# Patient Record
Sex: FEMALE | Race: WHITE | NOT HISPANIC OR LATINO | Employment: UNEMPLOYED | URBAN - METROPOLITAN AREA
[De-identification: names, ages, dates, MRNs, and addresses within clinical notes are randomized per-mention and may not be internally consistent; named-entity substitution may affect disease eponyms.]

---

## 2018-12-13 ENCOUNTER — HOSPITAL ENCOUNTER (EMERGENCY)
Facility: HOSPITAL | Age: 48
Discharge: PSYCHIATRIC OR SUBSTANCE USE FACILITY | End: 2018-12-13
Attending: EMERGENCY MEDICINE

## 2018-12-13 VITALS
OXYGEN SATURATION: 99 % | HEART RATE: 86 BPM | TEMPERATURE: 99 F | SYSTOLIC BLOOD PRESSURE: 119 MMHG | WEIGHT: 140 LBS | HEIGHT: 61 IN | RESPIRATION RATE: 18 BRPM | BODY MASS INDEX: 26.43 KG/M2 | DIASTOLIC BLOOD PRESSURE: 62 MMHG

## 2018-12-13 DIAGNOSIS — F29 PSYCHOSIS, UNSPECIFIED PSYCHOSIS TYPE: ICD-10-CM

## 2018-12-13 DIAGNOSIS — F30.9 MANIA: Primary | ICD-10-CM

## 2018-12-13 LAB
ALBUMIN SERPL BCP-MCNC: 4.2 G/DL
ALP SERPL-CCNC: 60 U/L
ALT SERPL W/O P-5'-P-CCNC: 53 U/L
AMMONIA PLAS-SCNC: 49 UMOL/L
AMPHET+METHAMPHET UR QL: NORMAL
ANION GAP SERPL CALC-SCNC: 11 MMOL/L
APAP SERPL-MCNC: <3 UG/ML
AST SERPL-CCNC: 45 U/L
B-HCG UR QL: NEGATIVE
BARBITURATES UR QL SCN>200 NG/ML: NEGATIVE
BASOPHILS # BLD AUTO: 0.05 K/UL
BASOPHILS NFR BLD: 0.5 %
BENZODIAZ UR QL SCN>200 NG/ML: NORMAL
BILIRUB SERPL-MCNC: 0.4 MG/DL
BILIRUB UR QL STRIP: NEGATIVE
BUN SERPL-MCNC: 26 MG/DL
BZE UR QL SCN: NEGATIVE
CALCIUM SERPL-MCNC: 9.3 MG/DL
CANNABINOIDS UR QL SCN: NEGATIVE
CHLORIDE SERPL-SCNC: 106 MMOL/L
CLARITY UR: CLEAR
CO2 SERPL-SCNC: 24 MMOL/L
COLOR UR: YELLOW
CREAT SERPL-MCNC: 1.5 MG/DL
CREAT UR-MCNC: 220.3 MG/DL
DIFFERENTIAL METHOD: ABNORMAL
EOSINOPHIL # BLD AUTO: 0.5 K/UL
EOSINOPHIL NFR BLD: 4.6 %
ERYTHROCYTE [DISTWIDTH] IN BLOOD BY AUTOMATED COUNT: 13.1 %
EST. GFR  (AFRICAN AMERICAN): 47 ML/MIN/1.73 M^2
EST. GFR  (NON AFRICAN AMERICAN): 41 ML/MIN/1.73 M^2
ETHANOL SERPL-MCNC: <10 MG/DL
GLUCOSE SERPL-MCNC: 93 MG/DL
GLUCOSE UR QL STRIP: NEGATIVE
HCT VFR BLD AUTO: 38.7 %
HGB BLD-MCNC: 13.5 G/DL
HGB UR QL STRIP: NEGATIVE
KETONES UR QL STRIP: NEGATIVE
LEUKOCYTE ESTERASE UR QL STRIP: NEGATIVE
LYMPHOCYTES # BLD AUTO: 2.8 K/UL
LYMPHOCYTES NFR BLD: 26.2 %
MCH RBC QN AUTO: 31.1 PG
MCHC RBC AUTO-ENTMCNC: 34.9 G/DL
MCV RBC AUTO: 89 FL
METHADONE UR QL SCN>300 NG/ML: NEGATIVE
MONOCYTES # BLD AUTO: 0.9 K/UL
MONOCYTES NFR BLD: 8.1 %
NEUTROPHILS # BLD AUTO: 6.6 K/UL
NEUTROPHILS NFR BLD: 60.6 %
NITRITE UR QL STRIP: NEGATIVE
OPIATES UR QL SCN: NORMAL
PCP UR QL SCN>25 NG/ML: NEGATIVE
PH UR STRIP: 6 [PH] (ref 5–8)
PLATELET # BLD AUTO: 304 K/UL
PMV BLD AUTO: 10.8 FL
POTASSIUM SERPL-SCNC: 3.6 MMOL/L
PROT SERPL-MCNC: 7.7 G/DL
PROT UR QL STRIP: ABNORMAL
RBC # BLD AUTO: 4.34 M/UL
SALICYLATES SERPL-MCNC: <5 MG/DL
SODIUM SERPL-SCNC: 141 MMOL/L
SP GR UR STRIP: 1.03 (ref 1–1.03)
TOXICOLOGY INFORMATION: NORMAL
TSH SERPL DL<=0.005 MIU/L-ACNC: 2.28 UIU/ML
TSH SERPL DL<=0.005 MIU/L-ACNC: 2.28 UIU/ML
URN SPEC COLLECT METH UR: ABNORMAL
UROBILINOGEN UR STRIP-ACNC: NEGATIVE EU/DL
WBC # BLD AUTO: 10.86 K/UL

## 2018-12-13 PROCEDURE — 25000003 PHARM REV CODE 250: Performed by: EMERGENCY MEDICINE

## 2018-12-13 PROCEDURE — 80329 ANALGESICS NON-OPIOID 1 OR 2: CPT

## 2018-12-13 PROCEDURE — 85025 COMPLETE CBC W/AUTO DIFF WBC: CPT

## 2018-12-13 PROCEDURE — 96372 THER/PROPH/DIAG INJ SC/IM: CPT

## 2018-12-13 PROCEDURE — 81025 URINE PREGNANCY TEST: CPT

## 2018-12-13 PROCEDURE — 63600175 PHARM REV CODE 636 W HCPCS: Performed by: EMERGENCY MEDICINE

## 2018-12-13 PROCEDURE — 82140 ASSAY OF AMMONIA: CPT

## 2018-12-13 PROCEDURE — 84443 ASSAY THYROID STIM HORMONE: CPT

## 2018-12-13 PROCEDURE — 80307 DRUG TEST PRSMV CHEM ANLYZR: CPT

## 2018-12-13 PROCEDURE — 36415 COLL VENOUS BLD VENIPUNCTURE: CPT

## 2018-12-13 PROCEDURE — 99285 EMERGENCY DEPT VISIT HI MDM: CPT | Mod: 25

## 2018-12-13 PROCEDURE — 80320 DRUG SCREEN QUANTALCOHOLS: CPT

## 2018-12-13 PROCEDURE — 81003 URINALYSIS AUTO W/O SCOPE: CPT | Mod: 59

## 2018-12-13 PROCEDURE — 80053 COMPREHEN METABOLIC PANEL: CPT

## 2018-12-13 PROCEDURE — 96360 HYDRATION IV INFUSION INIT: CPT

## 2018-12-13 RX ORDER — DIPHENHYDRAMINE HYDROCHLORIDE 50 MG/ML
50 INJECTION INTRAMUSCULAR; INTRAVENOUS
Status: COMPLETED | OUTPATIENT
Start: 2018-12-13 | End: 2018-12-13

## 2018-12-13 RX ORDER — HALOPERIDOL 5 MG/ML
5 INJECTION INTRAMUSCULAR
Status: COMPLETED | OUTPATIENT
Start: 2018-12-13 | End: 2018-12-13

## 2018-12-13 RX ADMIN — SODIUM CHLORIDE 1000 ML: 0.9 INJECTION, SOLUTION INTRAVENOUS at 04:12

## 2018-12-13 RX ADMIN — HALOPERIDOL LACTATE 5 MG: 5 INJECTION, SOLUTION INTRAMUSCULAR at 01:12

## 2018-12-13 RX ADMIN — DIPHENHYDRAMINE HYDROCHLORIDE 50 MG: 50 INJECTION INTRAMUSCULAR; INTRAVENOUS at 01:12

## 2018-12-13 NOTE — ED NOTES
Admit packet faxed to [facility], [facility] [place], [place], [facility], [facility], [facility], [facility], [place], [facility], [facility], [facility], [facility], [facility], [facility], [facility], [facility], [facility], Waiting for response.

## 2018-12-13 NOTE — ED NOTES
PEC initiated by physician;patient belongings removed from room; gray scrubs applied to patient and moved to bed 27. Pt transferred to room by nurse house RN

## 2018-12-13 NOTE — ED PROVIDER NOTES
SCRIBE #1 NOTE: I, Clemente Vaughn, am scribing for, and in the presence of, Ulysses Russell Jr., MD. I have scribed the HPI, NAY, PEx.     SCRIBE #2 NOTE: I, Sánchez Robb, am scribing for, and in the presence of,  Chelsey Fitzpatrick MD. I have scribed the remaining portions of the note not scribed by Scribe #1.     History      Chief Complaint   Patient presents with    Altered Mental Status     family says pt has had AMS x3d, violent upon EMS arrival. 100mg Ketamine given en route. Pt states she took a xanax that someone gave her.        Review of patient's allergies indicates:  No Known Allergies     HPI   HPI    12/13/2018, 1:00 AM   History obtained from the patient and EMS      History of Present Illness: Shanell Watts is a 48 y.o. female patient who presents to the Emergency Department for AMS x3 days. Pt's family reportedly called EMS due to pt's erratic and agitated behavior. She became agitated and violent with EMS during transport and was given 100 mg ketamine. She told EMS she had taken a xanax earlier today. Per EMS, pt has a hx of psychiatric illness and substance abuse, and has not been taking her anti-psychotics for 6 months. Symptoms are constant and moderate in severity. No mitigating or exacerbating factors reported. No other sxs. Patient denies any SI, fever, n/v/d, abd pain, CP, SOB, and all other sxs at this time. No further complaints or concerns at this time.         Arrival mode: AASI    PCP: Primary Doctor No     Past Medical History:  Past medical history reviewed not relevant      Past Surgical History:  Past surgical history reviewed not relevant      Family History:  Family history reviewed not relevant      Social History:  Social History    Social History Main Topics    Social History Main Topics    Smoking status: Unknown if ever smoked    Smokeless tobacco: Unknown if ever used    Alcohol Use: Unknown drinking history    Drug Use: Unknown if ever used    Sexual Activity: Unknown          ROS   Review of Systems   Constitutional: Negative for chills and fever.   HENT: Negative for sore throat.    Respiratory: Negative for shortness of breath.    Cardiovascular: Negative for chest pain.   Gastrointestinal: Negative for abdominal pain, diarrhea, nausea and vomiting.   Genitourinary: Negative for dysuria.   Musculoskeletal: Negative for back pain.   Skin: Negative for rash.   Neurological: Negative for dizziness, weakness, light-headedness and headaches.        (+) AMS   Hematological: Does not bruise/bleed easily.   Psychiatric/Behavioral: Positive for agitation. Negative for suicidal ideas.     Physical Exam      Initial Vitals [12/13/18 0105]   BP Pulse Resp Temp SpO2   (!) 187/107 (!) 130 (!) 22 98.7 °F (37.1 °C) 98 %      MAP       --          Physical Exam  Nursing Notes and Vital Signs Reviewed.  Constitutional: Patient is a bit confused on awakening from ketamine given prior to arrival.. Well-developed and well-nourished.  She is somewhat disheveled.  Head: Atraumatic. Normocephalic.  Eyes: PERRL. EOM intact. Conjunctivae are not pale. No scleral icterus.  ENT: Mucous membranes are moist. Oropharynx is clear and symmetric.    Neck: Supple. Full ROM. No lymphadenopathy.  Cardiovascular: Regular rate. Regular rhythm. No murmurs, rubs, or gallops. Distal pulses are 2+ and symmetric.  Pulmonary/Chest: No respiratory distress. Clear to auscultation bilaterally. No wheezing or rales.  Abdominal: Soft and non-distended.  There is no tenderness.  No rebound, guarding, or rigidity.   Musculoskeletal: Moves all extremities. No obvious deformities. No edema. No calf tenderness.  Skin: Warm and dry.  Neurological:  Alert, awake, and appropriate.  Normal speech.  No acute focal neurological deficits are appreciated.  Psychiatric:  Patient is labile with pressured speech and nonlinear thought content.  She was violent prior to arrival requiring chemical restraints by EMS with ketamine.  She is not  currently violently but she is somewhat confused.  She is tangential appears to be manic with an elevated mood.    ED Course    Procedures  ED Vital Signs:  Vitals:    12/13/18 0105 12/13/18 0110   BP: (!) 187/107 (!) 156/88   Pulse: (!) 130 (!) 125   Resp: (!) 22 (!) 24   Temp: 98.7 °F (37.1 °C)    TempSrc: Oral    SpO2: 98% 97%       Abnormal Lab Results:  Labs Reviewed   CBC W/ AUTO DIFFERENTIAL - Abnormal; Notable for the following components:       Result Value    MCH 31.1 (*)     All other components within normal limits   COMPREHENSIVE METABOLIC PANEL - Abnormal; Notable for the following components:    BUN, Bld 26 (*)     Creatinine 1.5 (*)     AST 45 (*)     ALT 53 (*)     eGFR if  47 (*)     eGFR if non  41 (*)     All other components within normal limits   URINALYSIS, REFLEX TO URINE CULTURE - Abnormal; Notable for the following components:    Protein, UA Trace (*)     All other components within normal limits    Narrative:     Preferred Collection Type->Urine, Clean Catch   ACETAMINOPHEN LEVEL - Abnormal; Notable for the following components:    Acetaminophen (Tylenol), Serum <3.0 (*)     All other components within normal limits   SALICYLATE LEVEL - Abnormal; Notable for the following components:    Salicylate Lvl <5.0 (*)     All other components within normal limits   TSH   DRUG SCREEN PANEL, URINE EMERGENCY   ALCOHOL,MEDICAL (ETHANOL)   PREGNANCY TEST, URINE RAPID   AMMONIA   TSH        All Lab Results:  Results for orders placed or performed during the hospital encounter of 12/13/18   CBC auto differential   Result Value Ref Range    WBC 10.86 3.90 - 12.70 K/uL    RBC 4.34 4.00 - 5.40 M/uL    Hemoglobin 13.5 12.0 - 16.0 g/dL    Hematocrit 38.7 37.0 - 48.5 %    MCV 89 82 - 98 fL    MCH 31.1 (H) 27.0 - 31.0 pg    MCHC 34.9 32.0 - 36.0 g/dL    RDW 13.1 11.5 - 14.5 %    Platelets 304 150 - 350 K/uL    MPV 10.8 9.2 - 12.9 fL    Gran # (ANC) 6.6 1.8 - 7.7 K/uL    Lymph #  2.8 1.0 - 4.8 K/uL    Mono # 0.9 0.3 - 1.0 K/uL    Eos # 0.5 0.0 - 0.5 K/uL    Baso # 0.05 0.00 - 0.20 K/uL    Gran% 60.6 38.0 - 73.0 %    Lymph% 26.2 18.0 - 48.0 %    Mono% 8.1 4.0 - 15.0 %    Eosinophil% 4.6 0.0 - 8.0 %    Basophil% 0.5 0.0 - 1.9 %    Differential Method Automated    Comprehensive metabolic panel   Result Value Ref Range    Sodium 141 136 - 145 mmol/L    Potassium 3.6 3.5 - 5.1 mmol/L    Chloride 106 95 - 110 mmol/L    CO2 24 23 - 29 mmol/L    Glucose 93 70 - 110 mg/dL    BUN, Bld 26 (H) 6 - 20 mg/dL    Creatinine 1.5 (H) 0.5 - 1.4 mg/dL    Calcium 9.3 8.7 - 10.5 mg/dL    Total Protein 7.7 6.0 - 8.4 g/dL    Albumin 4.2 3.5 - 5.2 g/dL    Total Bilirubin 0.4 0.1 - 1.0 mg/dL    Alkaline Phosphatase 60 55 - 135 U/L    AST 45 (H) 10 - 40 U/L    ALT 53 (H) 10 - 44 U/L    Anion Gap 11 8 - 16 mmol/L    eGFR if African American 47 (A) >60 mL/min/1.73 m^2    eGFR if non African American 41 (A) >60 mL/min/1.73 m^2   TSH   Result Value Ref Range    TSH 2.280 0.400 - 4.000 uIU/mL   Urinalysis, Reflex to Urine Culture Urine, Clean Catch   Result Value Ref Range    Specimen UA Urine, Catheterized     Color, UA Yellow Yellow, Straw, Raeann    Appearance, UA Clear Clear    pH, UA 6.0 5.0 - 8.0    Specific Gravity, UA 1.030 1.005 - 1.030    Protein, UA Trace (A) Negative    Glucose, UA Negative Negative    Ketones, UA Negative Negative    Bilirubin (UA) Negative Negative    Occult Blood UA Negative Negative    Nitrite, UA Negative Negative    Urobilinogen, UA Negative <2.0 EU/dL    Leukocytes, UA Negative Negative   Drug screen panel, emergency   Result Value Ref Range    Benzodiazepines Presumptive Positive     Methadone metabolites Negative     Cocaine (Metab.) Negative     Opiate Scrn, Ur Presumptive Positive     Barbiturate Screen, Ur Negative     Amphetamine Screen, Ur Presumptive Positive     THC Negative     Phencyclidine Negative     Creatinine, Random Ur 220.3 15.0 - 325.0 mg/dL    Toxicology Information  SEE COMMENT    Ethanol   Result Value Ref Range    Alcohol, Medical, Serum <10 <10 mg/dL   Acetaminophen level   Result Value Ref Range    Acetaminophen (Tylenol), Serum <3.0 (L) 10.0 - 20.0 ug/mL   Pregnancy, urine rapid   Result Value Ref Range    Preg Test, Ur Negative    Ammonia   Result Value Ref Range    Ammonia 49 10 - 50 umol/L   Salicylate level   Result Value Ref Range    Salicylate Lvl <5.0 (L) 15.0 - 30.0 mg/dL   TSH   Result Value Ref Range    TSH 2.280 0.400 - 4.000 uIU/mL            The Emergency Provider reviewed the vital signs and test results, which are outlined above.    ED Discussion     1:20 AM: The PEC hold has been issued by Dr. Russell at this time for AMS.    2:00 AM: Dr. Russell hands off pt to Dr. Fitzpatrick pending medical clearance.    4:28 AM: Pt has been medically cleared by Dr. Fitzpatrick at this time. Reassessed pt at this time. Pt is resting comfortably and appears in no acute distress. There are no psychiatric services offered at this facility. D/w pt all pertinent ED information and plan to transfer to psychiatric facility for psychiatric treatment. Pt verbalizes understanding. Patient being transferred by \[facility]\"" for ongoing personal protection en route. Pt will be transported by personnel trained in CPR and CPI. All questions and complaints have been addressed at this time. Pt condition is stable at this time and is clear to transfer to psychiatric facility at this time.     ED Medication(s):  Medications   sodium chloride 0.9% bolus 1,000 mL (not administered)   haloperidol lactate injection 5 mg (5 mg Intramuscular Given 12/13/18 0115)   diphenhydrAMINE injection 50 mg (50 mg Intramuscular Given 12/13/18 0115)             Medical Decision Making    Medical Decision Making:   Clinical Tests:   Lab Tests: Ordered and Reviewed           Scribe Attestation:   Scribe #1: I performed the above scribed service and the documentation accurately describes the services I performed. I attest to the  accuracy of the note.    Attending:   Physician Attestation Statement for Scribe #1: I, Ulysses Russell Jr., MD, personally performed the services described in this documentation, as scribed by Clemente Vaughn, in my presence, and it is both accurate and complete.         Attending Attestation:           Physician Attestation for Scribe:    Physician Attestation Statement for Scribe #2: I, Chelsey Fitzpatrick MD, reviewed documentation, as scribed by Sánchez Robb in my presence, and it is both accurate and complete. I also acknowledge and confirm the content of the note done by Scribe #1.          Clinical Impression       ICD-10-CM ICD-9-CM   1. Chandni F30.9 296.00   2. Psychosis, unspecified psychosis type F29 298.9       Disposition:   Disposition: Transferred  Condition: Stable         Chelsey Fitzpatrick MD  12/13/18 0422

## 2019-06-20 ENCOUNTER — HOSPITAL ENCOUNTER (EMERGENCY)
Facility: HOSPITAL | Age: 49
Discharge: PSYCHIATRIC OR SUBSTANCE USE FACILITY | End: 2019-06-21
Attending: EMERGENCY MEDICINE
Payer: MEDICAID

## 2019-06-20 DIAGNOSIS — N17.9 ACUTE KIDNEY INJURY: ICD-10-CM

## 2019-06-20 DIAGNOSIS — F15.10 METHAMPHETAMINE ABUSE: ICD-10-CM

## 2019-06-20 DIAGNOSIS — R00.0 TACHYCARDIA: ICD-10-CM

## 2019-06-20 DIAGNOSIS — E86.1 INTRAVASCULAR VOLUME DEPLETION: ICD-10-CM

## 2019-06-20 DIAGNOSIS — F23 ACUTE PSYCHOSIS: Primary | ICD-10-CM

## 2019-06-20 LAB
ALBUMIN SERPL BCP-MCNC: 3.9 G/DL (ref 3.5–5.2)
ALBUMIN SERPL BCP-MCNC: 4 G/DL (ref 3.5–5.2)
ALBUMIN SERPL BCP-MCNC: 4.9 G/DL (ref 3.5–5.2)
ALP SERPL-CCNC: 58 U/L (ref 55–135)
ALP SERPL-CCNC: 64 U/L (ref 55–135)
ALP SERPL-CCNC: 76 U/L (ref 55–135)
ALT SERPL W/O P-5'-P-CCNC: 38 U/L (ref 10–44)
ALT SERPL W/O P-5'-P-CCNC: 41 U/L (ref 10–44)
ALT SERPL W/O P-5'-P-CCNC: 46 U/L (ref 10–44)
AMORPH CRY URNS QL MICRO: ABNORMAL
AMPHET+METHAMPHET UR QL: NORMAL
ANION GAP SERPL CALC-SCNC: 11 MMOL/L (ref 8–16)
ANION GAP SERPL CALC-SCNC: 11 MMOL/L (ref 8–16)
ANION GAP SERPL CALC-SCNC: 20 MMOL/L (ref 8–16)
ANISOCYTOSIS BLD QL SMEAR: SLIGHT
ANISOCYTOSIS BLD QL SMEAR: SLIGHT
APAP SERPL-MCNC: <3 UG/ML (ref 10–20)
AST SERPL-CCNC: 81 U/L (ref 10–40)
AST SERPL-CCNC: 91 U/L (ref 10–40)
AST SERPL-CCNC: 93 U/L (ref 10–40)
B-HCG UR QL: NEGATIVE
BACTERIA #/AREA URNS HPF: ABNORMAL /HPF
BARBITURATES UR QL SCN>200 NG/ML: NEGATIVE
BASOPHILS # BLD AUTO: 0 K/UL (ref 0–0.2)
BASOPHILS # BLD AUTO: 0.01 K/UL (ref 0–0.2)
BASOPHILS # BLD AUTO: 0.03 K/UL (ref 0–0.2)
BASOPHILS NFR BLD: 0 % (ref 0–1.9)
BASOPHILS NFR BLD: 0.1 % (ref 0–1.9)
BASOPHILS NFR BLD: 0.2 % (ref 0–1.9)
BENZODIAZ UR QL SCN>200 NG/ML: NEGATIVE
BILIRUB SERPL-MCNC: 0.7 MG/DL (ref 0.1–1)
BILIRUB SERPL-MCNC: 1 MG/DL (ref 0.1–1)
BILIRUB SERPL-MCNC: 1.2 MG/DL (ref 0.1–1)
BILIRUB UR QL STRIP: ABNORMAL
BUN SERPL-MCNC: 34 MG/DL (ref 6–20)
BUN SERPL-MCNC: 47 MG/DL (ref 6–20)
BUN SERPL-MCNC: 60 MG/DL (ref 6–20)
BZE UR QL SCN: NEGATIVE
CALCIUM SERPL-MCNC: 10 MG/DL (ref 8.7–10.5)
CALCIUM SERPL-MCNC: 8.6 MG/DL (ref 8.7–10.5)
CALCIUM SERPL-MCNC: 8.8 MG/DL (ref 8.7–10.5)
CANNABINOIDS UR QL SCN: NEGATIVE
CHLORIDE SERPL-SCNC: 108 MMOL/L (ref 95–110)
CHLORIDE SERPL-SCNC: 110 MMOL/L (ref 95–110)
CHLORIDE SERPL-SCNC: 115 MMOL/L (ref 95–110)
CK SERPL-CCNC: 4125 U/L (ref 20–180)
CK SERPL-CCNC: 4175 U/L (ref 20–180)
CK SERPL-CCNC: 4862 U/L (ref 20–180)
CLARITY UR: ABNORMAL
CO2 SERPL-SCNC: 16 MMOL/L (ref 23–29)
CO2 SERPL-SCNC: 18 MMOL/L (ref 23–29)
CO2 SERPL-SCNC: 19 MMOL/L (ref 23–29)
COLOR UR: YELLOW
CREAT SERPL-MCNC: 0.9 MG/DL (ref 0.5–1.4)
CREAT SERPL-MCNC: 1.1 MG/DL (ref 0.5–1.4)
CREAT SERPL-MCNC: 1.8 MG/DL (ref 0.5–1.4)
CREAT UR-MCNC: 218.6 MG/DL (ref 15–325)
DACRYOCYTES BLD QL SMEAR: ABNORMAL
DACRYOCYTES BLD QL SMEAR: ABNORMAL
DIFFERENTIAL METHOD: ABNORMAL
EOSINOPHIL # BLD AUTO: 0 K/UL (ref 0–0.5)
EOSINOPHIL # BLD AUTO: 0 K/UL (ref 0–0.5)
EOSINOPHIL # BLD AUTO: 0.1 K/UL (ref 0–0.5)
EOSINOPHIL NFR BLD: 0 % (ref 0–8)
EOSINOPHIL NFR BLD: 0.1 % (ref 0–8)
EOSINOPHIL NFR BLD: 0.4 % (ref 0–8)
ERYTHROCYTE [DISTWIDTH] IN BLOOD BY AUTOMATED COUNT: 13.7 % (ref 11.5–14.5)
EST. GFR  (AFRICAN AMERICAN): 38 ML/MIN/1.73 M^2
EST. GFR  (AFRICAN AMERICAN): >60 ML/MIN/1.73 M^2
EST. GFR  (AFRICAN AMERICAN): >60 ML/MIN/1.73 M^2
EST. GFR  (NON AFRICAN AMERICAN): 33 ML/MIN/1.73 M^2
EST. GFR  (NON AFRICAN AMERICAN): 60 ML/MIN/1.73 M^2
EST. GFR  (NON AFRICAN AMERICAN): >60 ML/MIN/1.73 M^2
ETHANOL SERPL-MCNC: <10 MG/DL
GLUCOSE SERPL-MCNC: 101 MG/DL (ref 70–110)
GLUCOSE SERPL-MCNC: 106 MG/DL (ref 70–110)
GLUCOSE SERPL-MCNC: 96 MG/DL (ref 70–110)
GLUCOSE UR QL STRIP: NEGATIVE
HCT VFR BLD AUTO: 39.8 % (ref 37–48.5)
HCT VFR BLD AUTO: 42 % (ref 37–48.5)
HCT VFR BLD AUTO: 43.2 % (ref 37–48.5)
HGB BLD-MCNC: 13.6 G/DL (ref 12–16)
HGB BLD-MCNC: 14.6 G/DL (ref 12–16)
HGB BLD-MCNC: 14.9 G/DL (ref 12–16)
HGB UR QL STRIP: ABNORMAL
HYALINE CASTS #/AREA URNS LPF: 15 /LPF
KETONES UR QL STRIP: ABNORMAL
LEUKOCYTE ESTERASE UR QL STRIP: NEGATIVE
LYMPHOCYTES # BLD AUTO: 1.8 K/UL (ref 1–4.8)
LYMPHOCYTES # BLD AUTO: 2.8 K/UL (ref 1–4.8)
LYMPHOCYTES # BLD AUTO: 2.9 K/UL (ref 1–4.8)
LYMPHOCYTES NFR BLD: 10.3 % (ref 18–48)
LYMPHOCYTES NFR BLD: 16.9 % (ref 18–48)
LYMPHOCYTES NFR BLD: 21.3 % (ref 18–48)
MCH RBC QN AUTO: 30.8 PG (ref 27–31)
MCH RBC QN AUTO: 31.4 PG (ref 27–31)
MCH RBC QN AUTO: 31.7 PG (ref 27–31)
MCHC RBC AUTO-ENTMCNC: 34.2 G/DL (ref 32–36)
MCHC RBC AUTO-ENTMCNC: 34.5 G/DL (ref 32–36)
MCHC RBC AUTO-ENTMCNC: 34.8 G/DL (ref 32–36)
MCV RBC AUTO: 90 FL (ref 82–98)
MCV RBC AUTO: 90 FL (ref 82–98)
MCV RBC AUTO: 92 FL (ref 82–98)
METHADONE UR QL SCN>300 NG/ML: NEGATIVE
MICROSCOPIC COMMENT: ABNORMAL
MONOCYTES # BLD AUTO: 1.2 K/UL (ref 0.3–1)
MONOCYTES # BLD AUTO: 1.3 K/UL (ref 0.3–1)
MONOCYTES # BLD AUTO: 1.9 K/UL (ref 0.3–1)
MONOCYTES NFR BLD: 11.6 % (ref 4–15)
MONOCYTES NFR BLD: 7.8 % (ref 4–15)
MONOCYTES NFR BLD: 8.5 % (ref 4–15)
NEUTROPHILS # BLD AUTO: 11.9 K/UL (ref 1.8–7.7)
NEUTROPHILS # BLD AUTO: 14.1 K/UL (ref 1.8–7.7)
NEUTROPHILS # BLD AUTO: 9.4 K/UL (ref 1.8–7.7)
NEUTROPHILS NFR BLD: 69.6 % (ref 38–73)
NEUTROPHILS NFR BLD: 71.5 % (ref 38–73)
NEUTROPHILS NFR BLD: 82.3 % (ref 38–73)
NITRITE UR QL STRIP: NEGATIVE
OPIATES UR QL SCN: NEGATIVE
OVALOCYTES BLD QL SMEAR: ABNORMAL
OVALOCYTES BLD QL SMEAR: ABNORMAL
PCP UR QL SCN>25 NG/ML: NEGATIVE
PH UR STRIP: 5 [PH] (ref 5–8)
PLATELET # BLD AUTO: 270 K/UL (ref 150–350)
PLATELET # BLD AUTO: 310 K/UL (ref 150–350)
PLATELET # BLD AUTO: 357 K/UL (ref 150–350)
PLATELET BLD QL SMEAR: ABNORMAL
PLATELET BLD QL SMEAR: ABNORMAL
PMV BLD AUTO: 10.9 FL (ref 9.2–12.9)
PMV BLD AUTO: 11 FL (ref 9.2–12.9)
PMV BLD AUTO: 11.2 FL (ref 9.2–12.9)
POIKILOCYTOSIS BLD QL SMEAR: SLIGHT
POIKILOCYTOSIS BLD QL SMEAR: SLIGHT
POTASSIUM SERPL-SCNC: 3.8 MMOL/L (ref 3.5–5.1)
POTASSIUM SERPL-SCNC: 3.9 MMOL/L (ref 3.5–5.1)
POTASSIUM SERPL-SCNC: 4.1 MMOL/L (ref 3.5–5.1)
PROT SERPL-MCNC: 7.2 G/DL (ref 6–8.4)
PROT SERPL-MCNC: 7.5 G/DL (ref 6–8.4)
PROT SERPL-MCNC: 8.9 G/DL (ref 6–8.4)
PROT UR QL STRIP: ABNORMAL
RBC # BLD AUTO: 4.41 M/UL (ref 4–5.4)
RBC # BLD AUTO: 4.65 M/UL (ref 4–5.4)
RBC # BLD AUTO: 4.7 M/UL (ref 4–5.4)
RBC #/AREA URNS HPF: 2 /HPF (ref 0–4)
SODIUM SERPL-SCNC: 140 MMOL/L (ref 136–145)
SODIUM SERPL-SCNC: 144 MMOL/L (ref 136–145)
SODIUM SERPL-SCNC: 144 MMOL/L (ref 136–145)
SP GR UR STRIP: >=1.03 (ref 1–1.03)
STOMATOCYTES BLD QL SMEAR: PRESENT
TOXICOLOGY INFORMATION: NORMAL
TSH SERPL DL<=0.005 MIU/L-ACNC: 0.69 UIU/ML (ref 0.4–4)
URN SPEC COLLECT METH UR: ABNORMAL
UROBILINOGEN UR STRIP-ACNC: NEGATIVE EU/DL
WBC # BLD AUTO: 13.55 K/UL (ref 3.9–12.7)
WBC # BLD AUTO: 16.68 K/UL (ref 3.9–12.7)
WBC # BLD AUTO: 17.23 K/UL (ref 3.9–12.7)
WBC #/AREA URNS HPF: 7 /HPF (ref 0–5)

## 2019-06-20 PROCEDURE — 80320 DRUG SCREEN QUANTALCOHOLS: CPT

## 2019-06-20 PROCEDURE — 25000003 PHARM REV CODE 250: Performed by: EMERGENCY MEDICINE

## 2019-06-20 PROCEDURE — 82550 ASSAY OF CK (CPK): CPT | Mod: 91

## 2019-06-20 PROCEDURE — 93010 EKG 12-LEAD: ICD-10-PCS | Mod: ,,, | Performed by: INTERNAL MEDICINE

## 2019-06-20 PROCEDURE — 96372 THER/PROPH/DIAG INJ SC/IM: CPT | Mod: 59

## 2019-06-20 PROCEDURE — 99283 PR EMERGENCY DEPT VISIT,LEVEL III: ICD-10-PCS | Mod: GT,AF,HB,S$PBB | Performed by: PSYCHIATRY & NEUROLOGY

## 2019-06-20 PROCEDURE — 81000 URINALYSIS NONAUTO W/SCOPE: CPT | Mod: 59

## 2019-06-20 PROCEDURE — 86703 HIV-1/HIV-2 1 RESULT ANTBDY: CPT

## 2019-06-20 PROCEDURE — 80307 DRUG TEST PRSMV CHEM ANLYZR: CPT

## 2019-06-20 PROCEDURE — 93005 ELECTROCARDIOGRAM TRACING: CPT

## 2019-06-20 PROCEDURE — 96361 HYDRATE IV INFUSION ADD-ON: CPT

## 2019-06-20 PROCEDURE — 81025 URINE PREGNANCY TEST: CPT

## 2019-06-20 PROCEDURE — 85025 COMPLETE CBC W/AUTO DIFF WBC: CPT

## 2019-06-20 PROCEDURE — 96360 HYDRATION IV INFUSION INIT: CPT

## 2019-06-20 PROCEDURE — 80053 COMPREHEN METABOLIC PANEL: CPT | Mod: 91

## 2019-06-20 PROCEDURE — 99291 CRITICAL CARE FIRST HOUR: CPT | Mod: 25

## 2019-06-20 PROCEDURE — 99283 EMERGENCY DEPT VISIT LOW MDM: CPT | Mod: GT,AF,HB,S$PBB | Performed by: PSYCHIATRY & NEUROLOGY

## 2019-06-20 PROCEDURE — 80329 ANALGESICS NON-OPIOID 1 OR 2: CPT

## 2019-06-20 PROCEDURE — 80053 COMPREHEN METABOLIC PANEL: CPT

## 2019-06-20 PROCEDURE — 63600175 PHARM REV CODE 636 W HCPCS: Performed by: EMERGENCY MEDICINE

## 2019-06-20 PROCEDURE — 84443 ASSAY THYROID STIM HORMONE: CPT

## 2019-06-20 PROCEDURE — 93010 ELECTROCARDIOGRAM REPORT: CPT | Mod: ,,, | Performed by: INTERNAL MEDICINE

## 2019-06-20 RX ORDER — ZIPRASIDONE MESYLATE 20 MG/ML
20 INJECTION, POWDER, LYOPHILIZED, FOR SOLUTION INTRAMUSCULAR
Status: COMPLETED | OUTPATIENT
Start: 2019-06-20 | End: 2019-06-20

## 2019-06-20 RX ORDER — SODIUM CHLORIDE 9 MG/ML
1000 INJECTION, SOLUTION INTRAVENOUS
Status: COMPLETED | OUTPATIENT
Start: 2019-06-20 | End: 2019-06-21

## 2019-06-20 RX ORDER — ZIPRASIDONE MESYLATE 20 MG/ML
10 INJECTION, POWDER, LYOPHILIZED, FOR SOLUTION INTRAMUSCULAR
Status: DISCONTINUED | OUTPATIENT
Start: 2019-06-20 | End: 2019-06-20

## 2019-06-20 RX ADMIN — ZIPRASIDONE MESYLATE 20 MG: 20 INJECTION, POWDER, LYOPHILIZED, FOR SOLUTION INTRAMUSCULAR at 08:06

## 2019-06-20 RX ADMIN — SODIUM CHLORIDE 1000 ML: 0.9 INJECTION, SOLUTION INTRAVENOUS at 10:06

## 2019-06-20 RX ADMIN — SODIUM CHLORIDE 1000 ML: 0.9 INJECTION, SOLUTION INTRAVENOUS at 03:06

## 2019-06-20 RX ADMIN — SODIUM CHLORIDE 1000 ML: 0.9 INJECTION, SOLUTION INTRAVENOUS at 12:06

## 2019-06-20 NOTE — ED NOTES
"Pt awakens briefly during vital signs requesting for "Daddy". States "Is my Honey here"? Pt replies "yes" when asked if "honey" is her . She reports "Call Ladarius." Pt dozes back off to sleep. Vital signs stable. Will continue to monitor.  "

## 2019-06-20 NOTE — CONSULTS
"Tele-Consultation to Emergency Department from Psychiatry    Please see previous notes:    From current presentation:  Shanell Watts is a 48 y.o. female patient with PMHx of Bipolar disorder who presents to the Emergency Department for a psychiatric evaluation. Pt's daughter reports that the pt began to behave strangely yesterday. Pt's daughter states that she awoke today with the pt jumping into her bed naked. Per the daughter, the pt told her that she had taken "a bunch of pills", that her chest was hurting, and that she didn't want to live anymore. Pt's daughter states that the pt has a history of drug abuse in the past and that she is not on any medications for her bipolar disorder that she knows of. Pt had a similar episode last year which resulted in the pt being PEC'd.    From 12/13/18:  Shanell Watts is a 48 y.o. female patient who presents to the Emergency Department for AMS x3 days. Pt's family reportedly called EMS due to pt's erratic and agitated behavior. She became agitated and violent with EMS during transport and was given 100 mg ketamine. She told EMS she had taken a xanax earlier today. Per EMS, pt has a hx of psychiatric illness and substance abuse, and has not been taking her anti-psychotics for 6 months.    Patient agreeable to consultation via telepsychiatry.    Start time of consultation: 11:20 am.    The chief complaint leading to psychiatric consultation is: psychosis.  This consultation is from the Emergency Department attending physician Dr. Leeanna Gill.   The location of the consulting psychiatrist is [address] [place] [address].  The patient location is [facility].     Patient Identification:  Shanell Watts is a 48 y.o. female.    Patient information was obtained from daughter.     History of Present Illness:    Pt. Received IM Geodon 20 this morning, appears sedated. Pt. States, "I want my daddy". Then closes eyes and does not give answers to questions.    I spoke " separately in ER with Vivi: pt. Began acting strangely yesterday, today pt. Jumped into daughter's bed naked, spoke irrationally, said that her chest hurt, on and off expressed that she did not want to live anymore, said that she took a bunch of pills; h/o stimulant use; left residential drug rehab[[facility], was there for about 2 weeks] without completing the program a few days ago; daughter lives with pt. And pt.'s .    Psychiatric History:   Hospitalization: Yes, please see above    Past Medical History:   Past Medical History:   Diagnosis Date    Bipolar disorder       Allergies:  Review of patient's allergies indicates:   Allergen Reactions    Antihistamines - alkylamine     Codeine berenice-chlorphenir berenice Hives       Medications in ER:   Medications   ziprasidone injection 20 mg (20 mg Intramuscular Given 6/20/19 0859)   sodium chloride 0.9% bolus 1,000 mL (1,000 mLs Intravenous New Bag 6/20/19 1014)       Current Evaluation:     Constitutional  Vitals:  Vitals:    06/20/19 0844 06/20/19 0845 06/20/19 0900 06/20/19 0915   BP:       Pulse: (!) 138 (!) 132 (!) 136 108   Resp: 20      Temp: 97.9 °F (36.6 °C)      TempSrc: Axillary      SpO2: 98% 96% 98% 99%   Weight: 61.3 kg (135 lb 1.6 oz)       06/20/19 0930 06/20/19 0933 06/20/19 0945 06/20/19 1000   BP:  (!) 147/79     Pulse: 103  104 106   Resp:       Temp:       TempSrc:       SpO2: 96%  96% 97%   Weight:        06/20/19 1015   BP:    Pulse: 102   Resp:    Temp:    TempSrc:    SpO2: 100%   Weight:       General:  unremarkable, age appropriate     Musculoskeletal  Muscle Strength/Tone:   moving arms normally   Gait & Station:   sitting on stretcher     Psychiatric  Level of Consciousness: drowsy, s/p IM Geodon    Relevant Elements of Neurological Exam: no abnormality of posture noted    Assessment - Diagnosis - Goals:     Diagnosis/Impression:   Psychosis, unspecified[possibly substance induced]  H/o Substance Use  QTc 508 on EKG from today  Urine  tox positive for Amphetamine[Urine tox on 12/13/18 was positive for Benzo, Opiate, and Amphetamine]; LA  was checked, no result found  Elevated BUN/Creatinine, AST, ALT, Urinalysis[please see below]    Case d/w ER MD Dr. Gill.    Rec:  - follow EKG[pt. Received Geodon 20 mg IM this morning]   - medical clearance  - PEC and psychiatric hospitalization  - no standing psychotropic medication  - Haldol/Ativan p.o./i.m. Prn for agitation    Time with patient: 3 min    Laboratory Data:   Labs Reviewed   CBC W/ AUTO DIFFERENTIAL - Abnormal; Notable for the following components:       Result Value    WBC 17.23 (*)     Mean Corpuscular Hemoglobin 31.4 (*)     Platelets 357 (*)     All other components within normal limits   COMPREHENSIVE METABOLIC PANEL - Abnormal; Notable for the following components:    CO2 16 (*)     BUN, Bld 60 (*)     Creatinine 1.8 (*)     Total Protein 8.9 (*)     AST 93 (*)     ALT 46 (*)     Anion Gap 20 (*)     eGFR if  38 (*)     eGFR if non  33 (*)     All other components within normal limits   ACETAMINOPHEN LEVEL - Abnormal; Notable for the following components:    Acetaminophen (Tylenol), Serum <3.0 (*)     All other components within normal limits   URINALYSIS, REFLEX TO URINE CULTURE - Abnormal; Notable for the following components:    Appearance, UA Hazy (*)     Specific Gravity, UA >=1.030 (*)     Protein, UA 1+ (*)     Ketones, UA Trace (*)     Bilirubin (UA) 1+ (*)     Occult Blood UA 3+ (*)     All other components within normal limits    Narrative:     Preferred Collection Type->Urine, Catheterized   CK - Abnormal; Notable for the following components:    CPK 4862 (*)     All other components within normal limits   URINALYSIS MICROSCOPIC - Abnormal; Notable for the following components:    WBC, UA 7 (*)     Bacteria Few (*)     Hyaline Casts, UA 15 (*)     All other components within normal limits    Narrative:     Preferred Collection  Type->Urine, Catheterized   DRUG SCREEN PANEL, URINE EMERGENCY    Narrative:     Preferred Collection Type->Urine, Catheterized   ALCOHOL,MEDICAL (ETHANOL)   TSH   PREGNANCY TEST, URINE RAPID   HIV 1 / 2 ANTIBODY

## 2019-06-20 NOTE — ED NOTES
Faxed clinical packet to [[facility]] [facility], [facility] ([place] [facility] [place]), [facility], [facility] ([facility]), [facility]), [[facility], [facility], [[facility], [facility], [facility], [facility], [facility], [[facility]] [facility], [facility], [facility], [facility], [facility] (Central Intake). Awaiting response at this time.

## 2019-06-20 NOTE — ED NOTES
Pt's daughter states pt has not been herself for the past 2 days.  Pt's daughter reports pt jumped into her bed, naked, in the middle of the night screaming that her chest hurt.  Pt's daughter reports pt does not take daily medications, pt has history of bipolar, and previous psychiatric hospitalization.

## 2019-06-20 NOTE — ED NOTES
"Called lab about "in process" UPT from urine sent to lab at 0935. Lab states result is negative but that they forgot to result it in the computer.  "

## 2019-06-20 NOTE — ED NOTES
"Lab contacted about UPT results. States "req sent separate from specimen but will post results shortly."  "

## 2019-06-20 NOTE — ED NOTES
CPT staff actively seeking psych placement. Faxed clinical packet to [facility], [facility], [facility] ([email or web address] & [facility] ([facility] [[email or web address] Awaiting response at this time.

## 2019-06-20 NOTE — PROVIDER PROGRESS NOTES - EMERGENCY DEPT.
Encounter Date: 6/20/2019    ED Physician Progress Notes       SCRIBE NOTE: IAntionette, am scribing for, and in the presence of,  Keren Fam MD.  Physician Statement: IKeren Do, MD, personally performed the services described in this documentation as scribed by Antionette Driscoll in my presence, and it is both accurate and complete.          5:04 PM: Pt has been accepted at [facility] for the service of Dr. Briceño.

## 2019-06-20 NOTE — ED NOTES
Patient accepted by Joseph at [facility] ([address], [place]) for the service of Dr. Briceño. Report to be called to [phone], ext. [phone].      Request made to call report 20 minutes from note.

## 2019-06-20 NOTE — ED NOTES
Pt belongings placed in Radiology locker #1. Items include:    1 pair flip flops  1 pants  1 tshirt  1 hair tie

## 2019-06-20 NOTE — ED NOTES
Pts morena Schultz, came to visit. Informed pt is resting and is requested not to have visitors at this time. Asked to be contacted with updates. [phone].

## 2019-06-20 NOTE — ED NOTES
Centralized Placement request clarification regarding the elevated CPK levels to help with psych placement.

## 2019-06-20 NOTE — ED NOTES
Pt resting with eyes closed, respirations even and unlabored.  Soft restraints removed at this time.

## 2019-06-20 NOTE — ED NOTES
"Two unsuccessful blood draw attempts. Pt continues to resist during needle insertion, causing needle to become unstable multiple times. Will consult another RN for blood draw. Pt did awaken stating "Dadradha, where's Daddy? Don't let them hurt me Daddy". When pts  came to ER, he stated, "she calls me Daddy and I call her Mommy". Pt still resting in bed in no acute distress.   "

## 2019-06-20 NOTE — ED NOTES
"First attempt to give report to Zee (intake) at [facility]. States "I'll review her packet and call you back".   "

## 2019-06-20 NOTE — ED NOTES
Pt resting in PEC safe environment in no acute distress. Respirations even and unlabored. ED sitter at bedside. Bed in lowest position. Side rails raised x2 with padding as precaution. Will continue to monitor.

## 2019-06-20 NOTE — ED NOTES
"Call received from CPT reporting "It will be difficult to place a pt with CPK >2000 especially 4000. I recommend physician writing a note to explain why CPK is elevated. It may help her to be placed." Dr. Fam notified. Verbal order to give 1L NS bolus.  "

## 2019-06-21 VITALS
TEMPERATURE: 97 F | SYSTOLIC BLOOD PRESSURE: 158 MMHG | HEIGHT: 65 IN | DIASTOLIC BLOOD PRESSURE: 92 MMHG | WEIGHT: 135.13 LBS | HEART RATE: 69 BPM | RESPIRATION RATE: 16 BRPM | BODY MASS INDEX: 22.51 KG/M2 | OXYGEN SATURATION: 98 %

## 2019-06-21 PROBLEM — R45.89 SUICIDAL BEHAVIOR: Status: ACTIVE | Noted: 2019-06-21

## 2019-06-21 LAB
CK SERPL-CCNC: 2452 U/L (ref 20–180)
HIV 1+2 AB+HIV1 P24 AG SERPL QL IA: NEGATIVE

## 2019-06-21 PROCEDURE — 82550 ASSAY OF CK (CPK): CPT

## 2019-06-21 PROCEDURE — 96361 HYDRATE IV INFUSION ADD-ON: CPT

## 2019-06-21 PROCEDURE — 25000003 PHARM REV CODE 250: Performed by: EMERGENCY MEDICINE

## 2019-06-21 RX ORDER — DIPHENHYDRAMINE HCL 50 MG
50 CAPSULE ORAL EVERY 6 HOURS PRN
Status: DISCONTINUED | OUTPATIENT
Start: 2019-06-21 | End: 2019-06-21 | Stop reason: HOSPADM

## 2019-06-21 RX ORDER — LORAZEPAM 1 MG/1
1 TABLET ORAL
Status: DISCONTINUED | OUTPATIENT
Start: 2019-06-21 | End: 2019-06-21 | Stop reason: HOSPADM

## 2019-06-21 RX ADMIN — LORAZEPAM 1 MG: 1 TABLET ORAL at 07:06

## 2019-06-21 NOTE — ED NOTES
Admit packet faxed to [facility], [facility], [facility], and [facility].

## 2019-06-21 NOTE — PROVIDER PROGRESS NOTES - EMERGENCY DEPT.
SCRIBE #1 NOTE: I, Corinne Mack, am scribing for, and in the presence of, Tony Pederson MD. I have scribed the entire note.       10:40 AM: Dr. Pederson reassessed pt at this time. Pt is resting comfortably and appears in no acute distress. D/w pt all pertinent ED information. Pt verbalizes understanding. All questions and complaints have been addressed at this time. Pt has been accepted at a psychiatric care facility and their condition is stable at this time.   Accepting Facility: [facility]  Accepting Physician: Dr. Leyva      Physician Attestation Statement for Scribe #1: I, Tony Pederson MD, personally performed the services described in this documentation, as scribed by Corinne Mack, in my presence, and it is both accurate and complete.

## 2019-06-21 NOTE — ED NOTES
Pt spouse Amee Schultz contacted at this time per pts request. Facility information given at this time also.

## 2019-06-21 NOTE — ED NOTES
Patient accepted by [facility] ([address], [place], [place]) for the service of Dr. Leyva.  Report to be called to [phone].

## 2019-06-21 NOTE — ED NOTES
"Pt resting in PEC safe environment in no acute distress. Respirations even and unlabored. Pt wakes periodically asking "What did I say, I cant remember. Pt calm and alert when needed. ED sitter at bedside. Bed in lowest position. Side rails raised x2 with padding as precaution. Will continue to monitor.   "

## 2019-06-21 NOTE — ED NOTES
Centralized Placement notified by ER that placement at [facility] was denied.  Informed a new CPK level was done and to continue to seek placement.

## 2019-06-21 NOTE — ED NOTES
Patient sleeping, respirations easy and unlabored. Spoke with Jean Paul from CPT regarding CPK results and seeking placement.

## 2019-06-21 NOTE — ED NOTES
Dr. Deleon at [facility] requests pt be re-evaluated with head CT, recollect of CPK and renal labs, cbc, pt able to ambulate and trending vital downward until tomorrow. Resend paperwork tomorrow and they will re-evaluate tomorrow with new trends. CN notified.

## 2019-06-21 NOTE — ED NOTES
Pt suddenly got out of bed and started yelling. Pt easily redirected and got back in bed; no calm and cooperative. Dr Lewis notified and states he will order prn meds.

## 2019-06-21 NOTE — ED NOTES
Acknowledge transfer of care of patient. Patient resting in bed with no acute distress noted.  Respirations easy and unlabored, lungs clear. ABd soft nontender, BS x4. Denies any complaints at this time. IV site clear and patent. Able to make needs known. Bed in low position. Room remains cleared per PEC protocol and patient in huertas scrubs. Brenda assisting with observations.

## 2019-06-21 NOTE — ED NOTES
Report received from [facility]. Pt care assumed at this time. Pt previously accepted at [facility]. [facility] unable to accept pt at this time secondary to elevated CPK. NS bolus given and CPK to be redrawn at 0700. [facility] states call them after repeat comes back and they will re-evaluate for admission. Pt currently sleeping in bed, respirations even and unlabored. Pt easily arousable and oriented x4 with no complaint. Pt remains in grey gown and yellow socks with Daniele carbajal at bedside performing q15min checks. Will continue to monitor.

## 2019-06-21 NOTE — ED NOTES
History and report given to SHEREE Alexandra at [facility]. States they were not aware pts CPK was elevated. Will consult their medical MD to verify acceptance and will call back. CN notified. Chart Faxed to [facility] at this time.

## 2019-06-21 NOTE — ED NOTES
"Pt denies SI or A/VH at this time. Reports "I feel better today". Pt resting calmly in ED bed in no acute distress. Respirations even and unlabored. Pt notified of placement. Requests spouse be contacted to "bring cigarettes and clothes". Bed in lowest position. ED sitter at bedside. Will continue to monitor.  "

## 2019-06-22 PROBLEM — F15.10 AMPHETAMINE ABUSE: Status: ACTIVE | Noted: 2019-06-22

## 2019-06-22 PROBLEM — R74.8 ELEVATED CPK: Status: ACTIVE | Noted: 2019-06-22

## 2019-06-22 PROBLEM — R74.01 ELEVATED AST (SGOT): Status: ACTIVE | Noted: 2019-06-22

## 2019-09-07 ENCOUNTER — HOSPITAL ENCOUNTER (EMERGENCY)
Facility: HOSPITAL | Age: 49
Discharge: HOME | End: 2019-09-07
Attending: EMERGENCY MEDICINE
Payer: MEDICAID

## 2019-09-07 VITALS
DIASTOLIC BLOOD PRESSURE: 88 MMHG | HEART RATE: 84 BPM | SYSTOLIC BLOOD PRESSURE: 136 MMHG | RESPIRATION RATE: 20 BRPM | WEIGHT: 145.88 LBS | HEIGHT: 61 IN | OXYGEN SATURATION: 96 % | BODY MASS INDEX: 27.54 KG/M2 | TEMPERATURE: 98 F

## 2019-09-07 DIAGNOSIS — M54.40 BACK PAIN OF LUMBAR REGION WITH SCIATICA: Primary | ICD-10-CM

## 2019-09-07 DIAGNOSIS — F15.10 METHAMPHETAMINE USE: ICD-10-CM

## 2019-09-07 LAB
ALBUMIN SERPL BCP-MCNC: 4.3 G/DL (ref 3.5–5.2)
ALP SERPL-CCNC: 76 U/L (ref 55–135)
ALT SERPL W/O P-5'-P-CCNC: 66 U/L (ref 10–44)
AMPHET+METHAMPHET UR QL: NORMAL
ANION GAP SERPL CALC-SCNC: 12 MMOL/L (ref 8–16)
ANION GAP SERPL CALC-SCNC: 18 MMOL/L (ref 8–16)
AST SERPL-CCNC: 81 U/L (ref 10–40)
BARBITURATES UR QL SCN>200 NG/ML: NEGATIVE
BASOPHILS # BLD AUTO: 0.04 K/UL (ref 0–0.2)
BASOPHILS NFR BLD: 0.3 % (ref 0–1.9)
BENZODIAZ UR QL SCN>200 NG/ML: NEGATIVE
BILIRUB SERPL-MCNC: 0.3 MG/DL (ref 0.1–1)
BILIRUB UR QL STRIP: NEGATIVE
BUN SERPL-MCNC: 26 MG/DL (ref 6–20)
BUN SERPL-MCNC: 34 MG/DL (ref 6–20)
BZE UR QL SCN: NEGATIVE
CALCIUM SERPL-MCNC: 8 MG/DL (ref 8.7–10.5)
CALCIUM SERPL-MCNC: 9.2 MG/DL (ref 8.7–10.5)
CANNABINOIDS UR QL SCN: NEGATIVE
CHLORIDE SERPL-SCNC: 105 MMOL/L (ref 95–110)
CHLORIDE SERPL-SCNC: 110 MMOL/L (ref 95–110)
CK SERPL-CCNC: 1687 U/L (ref 20–180)
CK SERPL-CCNC: 2654 U/L (ref 20–180)
CLARITY UR: CLEAR
CO2 SERPL-SCNC: 16 MMOL/L (ref 23–29)
CO2 SERPL-SCNC: 16 MMOL/L (ref 23–29)
COLOR UR: YELLOW
CREAT SERPL-MCNC: 1.1 MG/DL (ref 0.5–1.4)
CREAT SERPL-MCNC: 2.1 MG/DL (ref 0.5–1.4)
CREAT UR-MCNC: 71.9 MG/DL (ref 15–325)
CRP SERPL-MCNC: 13.6 MG/L (ref 0–8.2)
DIFFERENTIAL METHOD: ABNORMAL
EOSINOPHIL # BLD AUTO: 0.6 K/UL (ref 0–0.5)
EOSINOPHIL NFR BLD: 4.3 % (ref 0–8)
ERYTHROCYTE [DISTWIDTH] IN BLOOD BY AUTOMATED COUNT: 15.5 % (ref 11.5–14.5)
ERYTHROCYTE [SEDIMENTATION RATE] IN BLOOD BY WESTERGREN METHOD: 17 MM/HR (ref 0–20)
EST. GFR  (AFRICAN AMERICAN): 31 ML/MIN/1.73 M^2
EST. GFR  (AFRICAN AMERICAN): >60 ML/MIN/1.73 M^2
EST. GFR  (NON AFRICAN AMERICAN): 27 ML/MIN/1.73 M^2
EST. GFR  (NON AFRICAN AMERICAN): 60 ML/MIN/1.73 M^2
GLUCOSE SERPL-MCNC: 106 MG/DL (ref 70–110)
GLUCOSE SERPL-MCNC: 118 MG/DL (ref 70–110)
GLUCOSE UR QL STRIP: NEGATIVE
HCT VFR BLD AUTO: 40.1 % (ref 37–48.5)
HGB BLD-MCNC: 13.7 G/DL (ref 12–16)
HGB UR QL STRIP: NEGATIVE
KETONES UR QL STRIP: NEGATIVE
LEUKOCYTE ESTERASE UR QL STRIP: NEGATIVE
LYMPHOCYTES # BLD AUTO: 2.9 K/UL (ref 1–4.8)
LYMPHOCYTES NFR BLD: 20.8 % (ref 18–48)
MCH RBC QN AUTO: 30.8 PG (ref 27–31)
MCHC RBC AUTO-ENTMCNC: 34.2 G/DL (ref 32–36)
MCV RBC AUTO: 90 FL (ref 82–98)
METHADONE UR QL SCN>300 NG/ML: NEGATIVE
MONOCYTES # BLD AUTO: 0.9 K/UL (ref 0.3–1)
MONOCYTES NFR BLD: 6.6 % (ref 4–15)
NEUTROPHILS # BLD AUTO: 9.3 K/UL (ref 1.8–7.7)
NEUTROPHILS NFR BLD: 68.2 % (ref 38–73)
NITRITE UR QL STRIP: NEGATIVE
OPIATES UR QL SCN: NORMAL
PCP UR QL SCN>25 NG/ML: NEGATIVE
PH UR STRIP: 6 [PH] (ref 5–8)
PLATELET # BLD AUTO: 421 K/UL (ref 150–350)
PMV BLD AUTO: 9.7 FL (ref 9.2–12.9)
POTASSIUM SERPL-SCNC: 3.4 MMOL/L (ref 3.5–5.1)
POTASSIUM SERPL-SCNC: 4.2 MMOL/L (ref 3.5–5.1)
PROT SERPL-MCNC: 8.3 G/DL (ref 6–8.4)
PROT UR QL STRIP: NEGATIVE
RBC # BLD AUTO: 4.45 M/UL (ref 4–5.4)
SODIUM SERPL-SCNC: 138 MMOL/L (ref 136–145)
SODIUM SERPL-SCNC: 139 MMOL/L (ref 136–145)
SP GR UR STRIP: 1.02 (ref 1–1.03)
TOXICOLOGY INFORMATION: NORMAL
URN SPEC COLLECT METH UR: NORMAL
UROBILINOGEN UR STRIP-ACNC: NEGATIVE EU/DL
WBC # BLD AUTO: 13.69 K/UL (ref 3.9–12.7)

## 2019-09-07 PROCEDURE — 86140 C-REACTIVE PROTEIN: CPT

## 2019-09-07 PROCEDURE — 80307 DRUG TEST PRSMV CHEM ANLYZR: CPT

## 2019-09-07 PROCEDURE — P9612 CATHETERIZE FOR URINE SPEC: HCPCS

## 2019-09-07 PROCEDURE — 80048 BASIC METABOLIC PNL TOTAL CA: CPT

## 2019-09-07 PROCEDURE — 82550 ASSAY OF CK (CPK): CPT

## 2019-09-07 PROCEDURE — 81003 URINALYSIS AUTO W/O SCOPE: CPT | Mod: 59

## 2019-09-07 PROCEDURE — 96361 HYDRATE IV INFUSION ADD-ON: CPT

## 2019-09-07 PROCEDURE — 80053 COMPREHEN METABOLIC PANEL: CPT

## 2019-09-07 PROCEDURE — 36415 COLL VENOUS BLD VENIPUNCTURE: CPT

## 2019-09-07 PROCEDURE — 96374 THER/PROPH/DIAG INJ IV PUSH: CPT

## 2019-09-07 PROCEDURE — 85651 RBC SED RATE NONAUTOMATED: CPT

## 2019-09-07 PROCEDURE — 63600175 PHARM REV CODE 636 W HCPCS: Performed by: EMERGENCY MEDICINE

## 2019-09-07 PROCEDURE — 99284 EMERGENCY DEPT VISIT MOD MDM: CPT | Mod: 25

## 2019-09-07 PROCEDURE — 85025 COMPLETE CBC W/AUTO DIFF WBC: CPT

## 2019-09-07 RX ORDER — HYDROCHLOROTHIAZIDE 12.5 MG/1
12.5 CAPSULE ORAL DAILY
COMMUNITY

## 2019-09-07 RX ORDER — LISINOPRIL 10 MG/1
10 TABLET ORAL DAILY
COMMUNITY

## 2019-09-07 RX ORDER — ARIPIPRAZOLE 2 MG/1
5 TABLET ORAL DAILY
COMMUNITY

## 2019-09-07 RX ORDER — HALOPERIDOL 5 MG/ML
5 INJECTION INTRAMUSCULAR
Status: COMPLETED | OUTPATIENT
Start: 2019-09-07 | End: 2019-09-07

## 2019-09-07 RX ADMIN — HALOPERIDOL LACTATE 5 MG: 5 INJECTION INTRAMUSCULAR at 01:09

## 2019-09-07 RX ADMIN — SODIUM CHLORIDE 1000 ML: 0.9 INJECTION, SOLUTION INTRAVENOUS at 02:09

## 2019-09-07 NOTE — ED PROVIDER NOTES
SCRIBE #1 NOTE: I, Mamadou Diamond, am scribing for, and in the presence of, Chelsey Fitzpatrick MD. I have scribed the entire note.       History     Chief Complaint   Patient presents with    Back Pain     Pt c/o of lower back pain radiating down the legs. pt extremely agitated and moving all extremities. Pt states she has lost control of bowel and bladder. Hx of same problem.      Review of patient's allergies indicates:   Allergen Reactions    Antihistamines - alkylamine     Codeine berenice-chlorphenir berenice Hives         History of Present Illness     HPI    9/7/2019, 12:45 PM  History obtained from the patient      History of Present Illness: Shanell Watts is a 48 y.o. female patient with a PMHx of bipolar disorder, restless legs, who presents to the Emergency Department for evaluation of lower back pain which onset gradually 3 days PTA, but worsening yesterday. Pt states that pain radiates down her legs, and says it feels similar to previous episodes of sciatic pain. Symptoms are constant and moderate in severity. No mitigating or exacerbating factors reported. Associated sxs include n/v/d, bilat foot numbness, bowel and bladder incontinence. Patient denies any fever, chills, sore throat, cough, CP, SOB, HA, syncope, flank pain, abdominal pain, and all other sxs at this time. No further complaints or concerns at this time.         Arrival mode: Personal vehicle    PCP: Primary Doctor No        Past Medical History:  Past Medical History:   Diagnosis Date    Bipolar disorder     Restless legs        Past Surgical History:  Past Surgical History:   Procedure Laterality Date    HYSTERECTOMY      TONSILLECTOMY           Family History:  Family History   Problem Relation Age of Onset    Birth defects Neg Hx        Social History:  Social History     Tobacco Use    Smoking status: Current Every Day Smoker    Smokeless tobacco: Current User   Substance and Sexual Activity    Alcohol use: No    Drug use: No    Sexual  activity: Unknown        Review of Systems     Review of Systems   Constitutional: Negative for chills, diaphoresis and fever.   HENT: Negative for sore throat.    Respiratory: Negative for cough and shortness of breath.    Cardiovascular: Negative for chest pain.   Gastrointestinal: Positive for diarrhea (2 episodes), nausea and vomiting. Negative for abdominal pain.        (+) bowel incontinence    Genitourinary: Negative for dysuria and flank pain.        (+) urinary incontinence    Musculoskeletal: Positive for back pain (lower).   Skin: Negative for rash.   Neurological: Positive for numbness (Bilat feet). Negative for dizziness, syncope, weakness, light-headedness and headaches.   Hematological: Does not bruise/bleed easily.        Physical Exam     Initial Vitals [09/07/19 1237]   BP Pulse Resp Temp SpO2   (!) 158/97 (!) 112 (!) 30 98.3 °F (36.8 °C) 100 %      MAP       --          Physical Exam  Nursing Notes and Vital Signs Reviewed.  Constitutional: Patient is in no acute distress. Pt is somewhat anxious, thrashing about in her ED bed. Well-developed and well-nourished.  Head: Atraumatic. Normocephalic.  Eyes: PERRL. EOM intact. Conjunctivae are not pale. No scleral icterus.  ENT: Mucous membranes are moist. Oropharynx is clear and symmetric.    Neck: Supple. Full ROM. No lymphadenopathy.  Cardiovascular: Tachycardic. Regular rhythm. No murmurs, rubs, or gallops. Distal pulses are 2+ and symmetric.  Pulmonary/Chest: No respiratory distress. Clear to auscultation bilaterally. No wheezing or rales.  Abdominal: Soft and non-distended.  There is no tenderness.  No rebound, guarding, or rigidity.  Genitourinary: No CVA tenderness  Musculoskeletal: Moves all extremities. No obvious deformities. No edema. No calf tenderness.  Skin: Warm and dry.  Neurological:  Alert, awake, and appropriate.  Normal speech.  No acute focal neurological deficits are appreciated.  Psychiatric: Normal affect. Good eye contact.  "Appropriate in content.  Rectal: Female chaperone present for the duration of the rectal exam.There is no tenderness. No masses or hemorrhoids. Normal sphincter tone. No stool present.     ED Course   Procedures  ED Vital Signs:  Vitals:    09/07/19 1237 09/07/19 1252 09/07/19 1348 09/07/19 1442   BP: (!) 158/97   135/73   Pulse: (!) 112  97 84   Resp: (!) 30   18   Temp: 98.3 °F (36.8 °C)   98.2 °F (36.8 °C)   TempSrc: Oral      SpO2: 100%  96% (!) 92%   Weight:  66.2 kg (145 lb 14.4 oz)     Height: 5' 1" (1.549 m)       09/07/19 1504 09/07/19 1616 09/07/19 1810 09/07/19 2017   BP: (!) 148/77 (!) 151/108 (!) 147/97 125/75   Pulse: 84 86 83 73   Resp:   18    Temp:       TempSrc:       SpO2: 95% 97% 96% 100%   Weight:       Height:        09/07/19 2143   BP: 136/88   Pulse: 84   Resp: 20   Temp: 98.1 °F (36.7 °C)   TempSrc: Oral   SpO2: 96%   Weight:    Height:        Abnormal Lab Results:  Labs Reviewed   CBC W/ AUTO DIFFERENTIAL - Abnormal; Notable for the following components:       Result Value    WBC 13.69 (*)     RDW 15.5 (*)     Platelets 421 (*)     Gran # (ANC) 9.3 (*)     Eos # 0.6 (*)     All other components within normal limits   COMPREHENSIVE METABOLIC PANEL - Abnormal; Notable for the following components:    CO2 16 (*)     Glucose 118 (*)     BUN, Bld 34 (*)     Creatinine 2.1 (*)     AST 81 (*)     ALT 66 (*)     Anion Gap 18 (*)     eGFR if  31 (*)     eGFR if non  27 (*)     All other components within normal limits   C-REACTIVE PROTEIN - Abnormal; Notable for the following components:    CRP 13.6 (*)     All other components within normal limits   CK - Abnormal; Notable for the following components:    CPK 2654 (*)     All other components within normal limits   CK - Abnormal; Notable for the following components:    CPK 1687 (*)     All other components within normal limits   BASIC METABOLIC PANEL - Abnormal; Notable for the following components:    Potassium 3.4 " (*)     CO2 16 (*)     BUN, Bld 26 (*)     Calcium 8.0 (*)     All other components within normal limits   URINALYSIS, REFLEX TO URINE CULTURE    Narrative:     Preferred Collection Type->Urine, Clean Catch   DRUG SCREEN PANEL, URINE EMERGENCY   SEDIMENTATION RATE   BASIC METABOLIC PANEL        All Lab Results:  Results for orders placed or performed during the hospital encounter of 09/07/19   CBC auto differential   Result Value Ref Range    WBC 13.69 (H) 3.90 - 12.70 K/uL    RBC 4.45 4.00 - 5.40 M/uL    Hemoglobin 13.7 12.0 - 16.0 g/dL    Hematocrit 40.1 37.0 - 48.5 %    Mean Corpuscular Volume 90 82 - 98 fL    Mean Corpuscular Hemoglobin 30.8 27.0 - 31.0 pg    Mean Corpuscular Hemoglobin Conc 34.2 32.0 - 36.0 g/dL    RDW 15.5 (H) 11.5 - 14.5 %    Platelets 421 (H) 150 - 350 K/uL    MPV 9.7 9.2 - 12.9 fL    Gran # (ANC) 9.3 (H) 1.8 - 7.7 K/uL    Lymph # 2.9 1.0 - 4.8 K/uL    Mono # 0.9 0.3 - 1.0 K/uL    Eos # 0.6 (H) 0.0 - 0.5 K/uL    Baso # 0.04 0.00 - 0.20 K/uL    Gran% 68.2 38.0 - 73.0 %    Lymph% 20.8 18.0 - 48.0 %    Mono% 6.6 4.0 - 15.0 %    Eosinophil% 4.3 0.0 - 8.0 %    Basophil% 0.3 0.0 - 1.9 %    Differential Method Automated    Comprehensive metabolic panel   Result Value Ref Range    Sodium 139 136 - 145 mmol/L    Potassium 4.2 3.5 - 5.1 mmol/L    Chloride 105 95 - 110 mmol/L    CO2 16 (L) 23 - 29 mmol/L    Glucose 118 (H) 70 - 110 mg/dL    BUN, Bld 34 (H) 6 - 20 mg/dL    Creatinine 2.1 (H) 0.5 - 1.4 mg/dL    Calcium 9.2 8.7 - 10.5 mg/dL    Total Protein 8.3 6.0 - 8.4 g/dL    Albumin 4.3 3.5 - 5.2 g/dL    Total Bilirubin 0.3 0.1 - 1.0 mg/dL    Alkaline Phosphatase 76 55 - 135 U/L    AST 81 (H) 10 - 40 U/L    ALT 66 (H) 10 - 44 U/L    Anion Gap 18 (H) 8 - 16 mmol/L    eGFR if African American 31 (A) >60 mL/min/1.73 m^2    eGFR if non African American 27 (A) >60 mL/min/1.73 m^2   C-reactive protein   Result Value Ref Range    CRP 13.6 (H) 0.0 - 8.2 mg/L   Urinalysis, Reflex to Urine Culture Urine, Clean  Catch   Result Value Ref Range    Specimen UA Urine, Catheterized     Color, UA Yellow Yellow, Straw, Raeann    Appearance, UA Clear Clear    pH, UA 6.0 5.0 - 8.0    Specific Gravity, UA 1.020 1.005 - 1.030    Protein, UA Negative Negative    Glucose, UA Negative Negative    Ketones, UA Negative Negative    Bilirubin (UA) Negative Negative    Occult Blood UA Negative Negative    Nitrite, UA Negative Negative    Urobilinogen, UA Negative <2.0 EU/dL    Leukocytes, UA Negative Negative   Drug screen panel, emergency   Result Value Ref Range    Benzodiazepines Negative     Methadone metabolites Negative     Cocaine (Metab.) Negative     Opiate Scrn, Ur Presumptive Positive     Barbiturate Screen, Ur Negative     Amphetamine Screen, Ur Presumptive Positive     THC Negative     Phencyclidine Negative     Creatinine, Random Ur 71.9 15.0 - 325.0 mg/dL    Toxicology Information SEE COMMENT    CPK   Result Value Ref Range    CPK 2654 (H) 20 - 180 U/L   Sedimentation rate   Result Value Ref Range    Sed Rate 17 0 - 20 mm/Hr   CPK   Result Value Ref Range    CPK 1687 (H) 20 - 180 U/L   Basic metabolic panel   Result Value Ref Range    Sodium 138 136 - 145 mmol/L    Potassium 3.4 (L) 3.5 - 5.1 mmol/L    Chloride 110 95 - 110 mmol/L    CO2 16 (L) 23 - 29 mmol/L    Glucose 106 70 - 110 mg/dL    BUN, Bld 26 (H) 6 - 20 mg/dL    Creatinine 1.1 0.5 - 1.4 mg/dL    Calcium 8.0 (L) 8.7 - 10.5 mg/dL    Anion Gap 12 8 - 16 mmol/L    eGFR if African American >60 >60 mL/min/1.73 m^2    eGFR if non African American 60 >60 mL/min/1.73 m^2              The Emergency Provider reviewed the vital signs and test results, which are outlined above.     ED Discussion       6:12 PM:  Discussed with pt all pertinent ED information and results. Discussed pt dx and plan of tx. Gave pt all f/u and return to the ED instructions. All questions and concerns were addressed at this time. Pt expresses understanding of information and instructions, and is  comfortable with plan to discharge. Pt is stable for discharge.    I discussed with patient and/or family/caretaker that evaluation in the ED does not suggest any emergent or life threatening medical conditions requiring immediate intervention beyond what was provided in the ED, and I believe patient is safe for discharge.  Regardless, an unremarkable evaluation in the ED does not preclude the development or presence of a serious of life threatening condition. As such, patient was instructed to return immediately for any worsening or change in current symptoms.         Medical Decision Making:   Clinical Tests:   Lab Tests: Ordered and Reviewed           ED Medication(s):  Medications   haloperidol lactate injection 5 mg (5 mg Intravenous Given 9/7/19 1304)   sodium chloride 0.9% bolus 1,000 mL (0 mLs Intravenous Stopped 9/7/19 153)       Discharge Medication List as of 9/7/2019  7:40 PM          Follow-up Information     [facility] In 2 days.    Contact information:  [address] [facility] [address] [phone]             [facility]    Specialty:  Emergency Medicine  Why:  As needed, If symptoms worsen  Contact information:  [address] [facility] [address] [phone]                     Scribe Attestation:   Scribe #1: I performed the above scribed service and the documentation accurately describes the services I performed. I attest to the accuracy of the note.     Attending:   Physician Attestation Statement for Scribe #1: I, Chelsey Fitzpatrick MD, personally performed the services described in this documentation, as scribed by Mamadou Diamond, in my presence, and it is both accurate and complete.           Clinical Impression       ICD-10-CM ICD-9-CM   1. Back pain of lumbar region with sciatica M54.40 724.2     724.3   2. Methamphetamine use F15.10 305.70       Disposition:   Disposition: Discharged  Condition: Stable         Chelsey Fitzpatrick  MD  09/12/19 0352

## 2019-09-07 NOTE — ED NOTES
The patient is resting quietly, eyes closed, arouses easily to stimuli. Airway is open and patent, respirations are spontaneous, snoring noted, skin warm and dry, appearance: in no acute distress and resting comfortably.

## 2019-09-07 NOTE — ED NOTES
"Pt educated on importance of not sitting up and standing up in the bed, pt states "well I hurt"   RN attempted to calm pt without success  "

## 2019-09-07 NOTE — ED NOTES
Spoke with patient's family member Bebe Adamson [phone], would be able to come  patient for discharge

## 2019-09-07 NOTE — ED NOTES
"Patient identifiers verified and correct for Shanell Watts.    LOC: The patient is awake, alert and aware of environment, the patient is oriented x 3. Pt with pressured speech and constant whimpering and tearful  APPEARANCE:Pts clothes appear dirty, and smell of body odor. Pt fidgetting over entire bed. Pt appears unkept   SKIN: The skin is warm and dry, color consistent with ethnicity, patient has normal skin turgor and moist mucus membranes, skin intact, no breakdown or bruising noted.  MUSCULOSKELETAL: Patient moving all extremities spontaneously. Tremors to BUE and BLE. Pt c/o leander lower back pain that radiates down both legs   RESPIRATORY: Airway is open and patent, respirations are spontaneous. Pt is tachypneic at this time, appears to be anxious  CARDIAC: Patient is tachycardiac, no periphreal edema noted, capillary refill < 3 seconds.  ABDOMEN: Soft and non tender to palpation.  : Pt reports she has been having multiple incontinent episodes and she states "I am unable to tell when I need to go"  "

## 2019-09-08 NOTE — ED NOTES
Spoke with Bebe Adamson, pts daughter, who stated that they were told the patient would be staying over night. Informed daughter that the pt was never to be admitted and that a family member needed to pick the patient up. She stated that they are coming from [facility] and would be about 45 minutes. Dr Fitzpatrick informed.

## 2019-09-08 NOTE — ED NOTES
Pt urinated on self. RN and ERT at bedside to clean and change pt. Pt able to stand and walk to the bathroom. Pt urinated and had a BM. Pt dressed in paper scrubs and placed back in bed

## 2021-09-01 NOTE — ED NOTES
Outpatient Physical Therapy Discharge Summary         Patient Name: Chris Cox  : 1945  MRN: [id number]    Today's Date: 2021    Visit Dx:  No diagnosis found.    PT OP Goals     Row Name 21 1040          PT Short Term Goals    STG Date to Achieve  21  -JS     STG 1  Remain negative for BPPV all canals.  -JS     STG 1 Progress Comments  Seen eval only  -JS        Long Term Goals    LTG Date to Achieve  21  -JS     LTG 1  Independent with dynamic adaptation techniques for community safety as needed.  -JS     LTG 1 Progress Comments  Seen eval only  -JS       User Key  (r) = Recorded By, (t) = Taken By, (c) = Cosigned By    Initials Name Provider Type    Flory Plaza PT Physical Therapist          OP PT Discharge Summary  Date of Discharge: 21  Reason for Discharge: other (comment) (Seen eval only)  Outcomes Achieved: Other (Unable to further reassess due to seen eval only)  Discharge Destination: Home with home program  Discharge Instructions/Additional Comments: Pt seen for eval only for a vestibular evaluation. Instruction provided in HEP & pt advised to contact PT if further visits needed. No further contact, therefore appropriate for d/c from PT at this time.      Time Calculation:                    Flory Blanco PT  2021        Patient accepted by Zee at [facility] ([address], [place]) for the service of Dr. Elder.  Report to be called to [phone], ext. [phone].

## 2022-02-06 ENCOUNTER — HOSPITAL ENCOUNTER (EMERGENCY)
Facility: HOSPITAL | Age: 52
Discharge: HOME | End: 2022-02-06
Attending: EMERGENCY MEDICINE
Payer: MEDICAID

## 2022-02-06 VITALS
WEIGHT: 142 LBS | RESPIRATION RATE: 20 BRPM | HEIGHT: 60 IN | BODY MASS INDEX: 27.88 KG/M2 | DIASTOLIC BLOOD PRESSURE: 59 MMHG | SYSTOLIC BLOOD PRESSURE: 112 MMHG | HEART RATE: 104 BPM | OXYGEN SATURATION: 98 % | TEMPERATURE: 99 F

## 2022-02-06 DIAGNOSIS — I10 HYPERTENSION, UNSPECIFIED TYPE: ICD-10-CM

## 2022-02-06 DIAGNOSIS — F41.9 ANXIETY: ICD-10-CM

## 2022-02-06 DIAGNOSIS — R07.9 CHEST PAIN: Primary | ICD-10-CM

## 2022-02-06 LAB
ALBUMIN SERPL BCP-MCNC: 3.5 G/DL (ref 3.5–5.2)
ALP SERPL-CCNC: 84 U/L (ref 55–135)
ALT SERPL W/O P-5'-P-CCNC: 45 U/L (ref 10–44)
AMPHET+METHAMPHET UR QL: NEGATIVE
ANION GAP SERPL CALC-SCNC: 10 MMOL/L (ref 8–16)
AST SERPL-CCNC: 24 U/L (ref 10–40)
BARBITURATES UR QL SCN>200 NG/ML: NEGATIVE
BASOPHILS # BLD AUTO: 0.06 K/UL (ref 0–0.2)
BASOPHILS NFR BLD: 0.7 % (ref 0–1.9)
BENZODIAZ UR QL SCN>200 NG/ML: NEGATIVE
BILIRUB SERPL-MCNC: 0.2 MG/DL (ref 0.1–1)
BILIRUB UR QL STRIP: NEGATIVE
BNP SERPL-MCNC: <10 PG/ML (ref 0–99)
BUN SERPL-MCNC: 22 MG/DL (ref 6–20)
BZE UR QL SCN: NEGATIVE
CALCIUM SERPL-MCNC: 8.6 MG/DL (ref 8.7–10.5)
CANNABINOIDS UR QL SCN: NEGATIVE
CHLORIDE SERPL-SCNC: 109 MMOL/L (ref 95–110)
CLARITY UR: CLEAR
CO2 SERPL-SCNC: 22 MMOL/L (ref 23–29)
COLOR UR: YELLOW
CREAT SERPL-MCNC: 0.9 MG/DL (ref 0.5–1.4)
CREAT UR-MCNC: 47.9 MG/DL (ref 15–325)
DIFFERENTIAL METHOD: ABNORMAL
EOSINOPHIL # BLD AUTO: 0.2 K/UL (ref 0–0.5)
EOSINOPHIL NFR BLD: 2.2 % (ref 0–8)
ERYTHROCYTE [DISTWIDTH] IN BLOOD BY AUTOMATED COUNT: 16.3 % (ref 11.5–14.5)
EST. GFR  (AFRICAN AMERICAN): >60 ML/MIN/1.73 M^2
EST. GFR  (NON AFRICAN AMERICAN): >60 ML/MIN/1.73 M^2
GLUCOSE SERPL-MCNC: 160 MG/DL (ref 70–110)
GLUCOSE UR QL STRIP: NEGATIVE
HCT VFR BLD AUTO: 34.9 % (ref 37–48.5)
HGB BLD-MCNC: 10.8 G/DL (ref 12–16)
HGB UR QL STRIP: NEGATIVE
IMM GRANULOCYTES # BLD AUTO: 0.02 K/UL (ref 0–0.04)
IMM GRANULOCYTES NFR BLD AUTO: 0.2 % (ref 0–0.5)
KETONES UR QL STRIP: NEGATIVE
LEUKOCYTE ESTERASE UR QL STRIP: NEGATIVE
LYMPHOCYTES # BLD AUTO: 2 K/UL (ref 1–4.8)
LYMPHOCYTES NFR BLD: 24.8 % (ref 18–48)
MCH RBC QN AUTO: 26 PG (ref 27–31)
MCHC RBC AUTO-ENTMCNC: 30.9 G/DL (ref 32–36)
MCV RBC AUTO: 84 FL (ref 82–98)
METHADONE UR QL SCN>300 NG/ML: NEGATIVE
MONOCYTES # BLD AUTO: 0.7 K/UL (ref 0.3–1)
MONOCYTES NFR BLD: 8.5 % (ref 4–15)
NEUTROPHILS # BLD AUTO: 5.1 K/UL (ref 1.8–7.7)
NEUTROPHILS NFR BLD: 63.6 % (ref 38–73)
NITRITE UR QL STRIP: NEGATIVE
NRBC BLD-RTO: 0 /100 WBC
OPIATES UR QL SCN: NEGATIVE
PCP UR QL SCN>25 NG/ML: NEGATIVE
PH UR STRIP: 7 [PH] (ref 5–8)
PLATELET # BLD AUTO: 359 K/UL (ref 150–450)
PMV BLD AUTO: 10.6 FL (ref 9.2–12.9)
POTASSIUM SERPL-SCNC: 3.8 MMOL/L (ref 3.5–5.1)
PROT SERPL-MCNC: 7.3 G/DL (ref 6–8.4)
PROT UR QL STRIP: NEGATIVE
RBC # BLD AUTO: 4.16 M/UL (ref 4–5.4)
SODIUM SERPL-SCNC: 141 MMOL/L (ref 136–145)
SP GR UR STRIP: 1.01 (ref 1–1.03)
TOXICOLOGY INFORMATION: NORMAL
TROPONIN I SERPL DL<=0.01 NG/ML-MCNC: <0.006 NG/ML (ref 0–0.03)
TSH SERPL DL<=0.005 MIU/L-ACNC: 0.48 UIU/ML (ref 0.4–4)
URN SPEC COLLECT METH UR: NORMAL
UROBILINOGEN UR STRIP-ACNC: NEGATIVE EU/DL
WBC # BLD AUTO: 8.03 K/UL (ref 3.9–12.7)

## 2022-02-06 PROCEDURE — 84443 ASSAY THYROID STIM HORMONE: CPT | Performed by: EMERGENCY MEDICINE

## 2022-02-06 PROCEDURE — 84484 ASSAY OF TROPONIN QUANT: CPT | Performed by: EMERGENCY MEDICINE

## 2022-02-06 PROCEDURE — 99285 EMERGENCY DEPT VISIT HI MDM: CPT | Mod: 25

## 2022-02-06 PROCEDURE — 80053 COMPREHEN METABOLIC PANEL: CPT | Performed by: EMERGENCY MEDICINE

## 2022-02-06 PROCEDURE — 36000 PLACE NEEDLE IN VEIN: CPT

## 2022-02-06 PROCEDURE — 80307 DRUG TEST PRSMV CHEM ANLYZR: CPT | Performed by: EMERGENCY MEDICINE

## 2022-02-06 PROCEDURE — 93010 EKG 12-LEAD: ICD-10-PCS | Mod: ,,, | Performed by: INTERNAL MEDICINE

## 2022-02-06 PROCEDURE — 81003 URINALYSIS AUTO W/O SCOPE: CPT | Mod: 59 | Performed by: EMERGENCY MEDICINE

## 2022-02-06 PROCEDURE — 83880 ASSAY OF NATRIURETIC PEPTIDE: CPT | Performed by: EMERGENCY MEDICINE

## 2022-02-06 PROCEDURE — 93005 ELECTROCARDIOGRAM TRACING: CPT

## 2022-02-06 PROCEDURE — 85025 COMPLETE CBC W/AUTO DIFF WBC: CPT | Performed by: EMERGENCY MEDICINE

## 2022-02-06 PROCEDURE — 25000003 PHARM REV CODE 250: Performed by: EMERGENCY MEDICINE

## 2022-02-06 PROCEDURE — 93010 ELECTROCARDIOGRAM REPORT: CPT | Mod: ,,, | Performed by: INTERNAL MEDICINE

## 2022-02-06 RX ORDER — ASPIRIN 325 MG
325 TABLET ORAL
Status: COMPLETED | OUTPATIENT
Start: 2022-02-06 | End: 2022-02-06

## 2022-02-06 RX ORDER — ALPRAZOLAM 0.25 MG/1
0.25 TABLET ORAL
Status: COMPLETED | OUTPATIENT
Start: 2022-02-06 | End: 2022-02-06

## 2022-02-06 RX ADMIN — ALPRAZOLAM 0.25 MG: 0.25 TABLET ORAL at 06:02

## 2022-02-06 RX ADMIN — NITROGLYCERIN 1 INCH: 20 OINTMENT TOPICAL at 06:02

## 2022-02-06 RX ADMIN — ASPIRIN 325 MG ORAL TABLET 325 MG: 325 PILL ORAL at 06:02

## 2022-02-06 NOTE — ED PROVIDER NOTES
SCRIBE #1 NOTE: I, Isis Tristan, am scribing for, and in the presence of, Leeanna Gill MD. I have scribed the entire note.       History     Chief Complaint   Patient presents with    Chest Pain     Review of patient's allergies indicates:   Allergen Reactions    Antihistamines - alkylamine     Codeine berenice-chlorphenir berenice Hives         History of Present Illness     HPI    2/6/2022, 5:55 PM  History obtained from the patient      History of Present Illness: Shanell Watts is a 51 y.o. female patient with a PMHx of HTN, bipolar disorder, hysterectomy, who presents to the Emergency Department for evaluation of chest tightness which onset gradually 3 hours PTA. Pt states her BP was high and she does take BP meds. Pt came from [facility] in [place]. Symptoms are constant and moderate in severity. No mitigating or exacerbating factors reported. Associated sxs include nausea, L arm pain, disoriented (resolved). Patient denies any dysuria, abd pain, back pain, fever, chills, and all other sxs at this time. Pt states she does smoke but is trying to quit. No further complaints or concerns at this time.       Arrival mode: Ambulance Service    PCP: Primary Doctor No        Past Medical History:  Past Medical History:   Diagnosis Date    Bipolar disorder     Hypertension     Restless legs        Past Surgical History:  Past Surgical History:   Procedure Laterality Date    HYSTERECTOMY      TONSILLECTOMY           Family History:  Family History   Problem Relation Age of Onset    Birth defects Neg Hx        Social History:  Social History     Tobacco Use    Smoking status: Current Every Day Smoker    Smokeless tobacco: Current User   Substance and Sexual Activity    Alcohol use: No    Drug use: No    Sexual activity: Not on file        Review of Systems     Review of Systems   Constitutional: Negative for chills and fever.   HENT: Negative for sore throat.    Respiratory: Positive for chest tightness.  Negative for shortness of breath.    Cardiovascular: Negative for chest pain.   Gastrointestinal: Positive for nausea. Negative for abdominal pain.   Genitourinary: Negative for dysuria.   Musculoskeletal: Positive for myalgias (L arm). Negative for back pain.   Skin: Negative for rash.   Neurological: Negative for weakness.   Hematological: Does not bruise/bleed easily.   Psychiatric/Behavioral:        (+) disoriented (resolved)   All other systems reviewed and are negative.       Physical Exam     Initial Vitals   BP Pulse Resp Temp SpO2   02/06/22 1749 02/06/22 1749 02/06/22 1749 02/06/22 1813 02/06/22 1749   (!) 154/80 (!) 115 18 98.7 °F (37.1 °C) 98 %      MAP       --                 Physical Exam  Nursing Notes and Vital Signs Reviewed.  Constitutional: Patient is in no acute distress. Anxious.  Head: Atraumatic. Normocephalic.  Eyes: PERRL. EOM intact. Conjunctivae are not pale. No scleral icterus.  ENT: Mucous membranes are moist. Oropharynx is clear and symmetric.    Neck: Supple. Full ROM. No lymphadenopathy.  Cardiovascular: Regular rate. Regular rhythm. No murmurs, rubs, or gallops. Distal pulses are 2+ and symmetric.  Pulmonary/Chest: No respiratory distress. Clear to auscultation bilaterally. No wheezing or rales.  Abdominal: Soft and non-distended.  There is no tenderness.  No rebound, guarding, or rigidity. Good bowel sounds.  Genitourinary: No CVA tenderness  Musculoskeletal: Moves all extremities. No obvious deformities. No edema. No calf tenderness.  Skin: Warm and dry.  Neurological:  Alert, awake, and appropriate.  Normal speech.  No acute focal neurological deficits are appreciated.  Psychiatric: Normal affect. Good eye contact. Appropriate in content.     ED Course   Procedures  ED Vital Signs:  Vitals:    02/06/22 1749 02/06/22 1805 02/06/22 1812 02/06/22 1813   BP: (!) 154/80 (!) 142/66     Pulse: (!) 115 107     Resp: 18 (!) 21     Temp:    98.7 °F (37.1 °C)   TempSrc:    Oral   SpO2: 98%  98%     Weight:   64.4 kg (142 lb)    Height:    5' (1.524 m)    02/06/22 1819 02/06/22 1820   BP:  (!) 148/71   Pulse: (!) 111 106   Resp:  (!) 21   Temp:     TempSrc:     SpO2:  98%   Weight:     Height:         Abnormal Lab Results:  Labs Reviewed   CBC W/ AUTO DIFFERENTIAL - Abnormal; Notable for the following components:       Result Value    Hemoglobin 10.8 (*)     Hematocrit 34.9 (*)     MCH 26.0 (*)     MCHC 30.9 (*)     RDW 16.3 (*)     All other components within normal limits   COMPREHENSIVE METABOLIC PANEL - Abnormal; Notable for the following components:    CO2 22 (*)     Glucose 160 (*)     BUN 22 (*)     Calcium 8.6 (*)     ALT 45 (*)     All other components within normal limits   TROPONIN I   B-TYPE NATRIURETIC PEPTIDE   TSH   DRUG SCREEN PANEL, URINE EMERGENCY   URINALYSIS, REFLEX TO URINE CULTURE        All Lab Results:  Results for orders placed or performed during the hospital encounter of 02/06/22   CBC auto differential   Result Value Ref Range    WBC 8.03 3.90 - 12.70 K/uL    RBC 4.16 4.00 - 5.40 M/uL    Hemoglobin 10.8 (L) 12.0 - 16.0 g/dL    Hematocrit 34.9 (L) 37.0 - 48.5 %    MCV 84 82 - 98 fL    MCH 26.0 (L) 27.0 - 31.0 pg    MCHC 30.9 (L) 32.0 - 36.0 g/dL    RDW 16.3 (H) 11.5 - 14.5 %    Platelets 359 150 - 450 K/uL    MPV 10.6 9.2 - 12.9 fL    Immature Granulocytes 0.2 0.0 - 0.5 %    Gran # (ANC) 5.1 1.8 - 7.7 K/uL    Immature Grans (Abs) 0.02 0.00 - 0.04 K/uL    Lymph # 2.0 1.0 - 4.8 K/uL    Mono # 0.7 0.3 - 1.0 K/uL    Eos # 0.2 0.0 - 0.5 K/uL    Baso # 0.06 0.00 - 0.20 K/uL    nRBC 0 0 /100 WBC    Gran % 63.6 38.0 - 73.0 %    Lymph % 24.8 18.0 - 48.0 %    Mono % 8.5 4.0 - 15.0 %    Eosinophil % 2.2 0.0 - 8.0 %    Basophil % 0.7 0.0 - 1.9 %    Differential Method Automated    Comprehensive metabolic panel   Result Value Ref Range    Sodium 141 136 - 145 mmol/L    Potassium 3.8 3.5 - 5.1 mmol/L    Chloride 109 95 - 110 mmol/L    CO2 22 (L) 23 - 29 mmol/L    Glucose 160 (H) 70 - 110  mg/dL    BUN 22 (H) 6 - 20 mg/dL    Creatinine 0.9 0.5 - 1.4 mg/dL    Calcium 8.6 (L) 8.7 - 10.5 mg/dL    Total Protein 7.3 6.0 - 8.4 g/dL    Albumin 3.5 3.5 - 5.2 g/dL    Total Bilirubin 0.2 0.1 - 1.0 mg/dL    Alkaline Phosphatase 84 55 - 135 U/L    AST 24 10 - 40 U/L    ALT 45 (H) 10 - 44 U/L    Anion Gap 10 8 - 16 mmol/L    eGFR if African American >60 >60 mL/min/1.73 m^2    eGFR if non African American >60 >60 mL/min/1.73 m^2   Troponin I #1   Result Value Ref Range    Troponin I <0.006 0.000 - 0.026 ng/mL   BNP   Result Value Ref Range    BNP <10 0 - 99 pg/mL   TSH   Result Value Ref Range    TSH 0.480 0.400 - 4.000 uIU/mL       Imaging Results:  Imaging Results          X-Ray Chest AP Portable (Final result)  Result time 02/06/22 18:37:19    Final result by Chetna Santana MD (02/06/22 18:37:19)                 Impression:      No acute abnormality.      Electronically signed by: Max Basilio  Date:    02/06/2022  Time:    18:37             Narrative:    EXAMINATION:  XR CHEST AP PORTABLE    CLINICAL HISTORY:  Chest Pain;    TECHNIQUE:  Single frontal view of the chest was performed.    COMPARISON:  None    FINDINGS:  The lungs are clear, with normal appearance of pulmonary vasculature and no pleural effusion or pneumothorax.    The cardiac silhouette is normal in size. The hilar and mediastinal contours are unremarkable.    Bones are intact.                                 The EKG was ordered, reviewed, and independently interpreted by the ED provider.  Interpretation time: 5:56 PM  Rate: 104 BPM  Rhythm: sinus tachycardia with frequent premature ventricular complexes.  Interpretation: Possible left atrial enlargement. No STEMI.           The Emergency Provider reviewed the vital signs and test results, which are outlined above.     ED Discussion       7:19 PM: Reassessed pt at this time, she is feeling better, she does not want to stay for another set of cardiac enzymes which is indicated considering her  risk factors, she is adamant about being discharged. Discussed with pt all pertinent ED information and results. Discussed pt dx and plan of tx. Gave pt all f/u and return to the ED instructions. All questions and concerns were addressed at this time. Pt expresses understanding of information and instructions, and is comfortable with plan to discharge. Pt is stable for discharge.    I discussed with patient and/or family/caretaker that evaluation in the ED does not suggest any emergent or life threatening medical conditions requiring immediate intervention beyond what was provided in the ED, and I believe patient is safe for discharge.  Regardless, an unremarkable evaluation in the ED does not preclude the development or presence of a serious of life threatening condition. As such, patient was instructed to return immediately for any worsening or change in current symptoms.       Medical Decision Making:   Clinical Tests:   Lab Tests: Ordered and Reviewed  Radiological Study: Ordered and Reviewed  Medical Tests: Ordered and Reviewed           ED Medication(s):  Medications   aspirin tablet 325 mg (325 mg Oral Given 2/6/22 1811)   nitroGLYCERIN 2% TD oint ointment 1 inch (1 inch Topical (Top) Given 2/6/22 1811)   ALPRAZolam tablet 0.25 mg (0.25 mg Oral Given 2/6/22 1811)       New Prescriptions    No medications on file        Follow-up Information     [facility]. Schedule an appointment as soon as possible for a visit in 2 days.    Specialty: Cardiology  Why: Return to the Emergency Room, If symptoms worsen  Contact information:  [address] [facility] [id number] [phone]  Additional information:  Please park on the [address] side and use the Clinic entrance. Check in on the 3rd floor, to the right.                           Scribe Attestation:   Scribe #1: I performed the above scribed service and the documentation accurately describes the services I performed. I  attest to the accuracy of the note.     Attending:   Physician Attestation Statement for Scribe #1: I, Leeanna Gill MD, personally performed the services described in this documentation, as scribed by Isis Tristan, in my presence, and it is both accurate and complete.           Clinical Impression       ICD-10-CM ICD-9-CM   1. Chest pain  R07.9 786.50   2. Hypertension, unspecified type  I10 401.9   3. Anxiety  F41.9 300.00       Disposition:   Disposition: Discharged  Condition: Stable       Leeanna Gill MD  02/06/22 1939

## 2022-11-14 NOTE — ED PROVIDER NOTES
"SCRIBE #1 NOTE: I, Corinne Mack, am scribing for, and in the presence of, Leeanna Gill MD. I have scribed the entire note.      History    No chief complaint on file.      Review of patient's allergies indicates:   Allergen Reactions    Antihistamines - alkylamine     Codeine berenice-chlorphenir berenice Hives        HPI   HPI    6/20/2019, 8:44 AM   History obtained from the patient's family  HPI and ROS limited secondary to pt psychiatric disorder      History of Present Illness: Shanell Watts is a 48 y.o. female patient with PMHx of Bipolar disorder who presents to the Emergency Department for a psychiatric evaluation. Pt's daughter reports that the pt began to behave strangely yesterday. Pt's daughter states that she awoke today with the pt jumping into her bed naked. Per the daughter, the pt told her that she had taken "a bunch of pills", that her chest was hurting, and that she didn't want to live anymore. Pt's daughter states that the pt has a history of drug abuse in the past and that she is not on any medications for her bipolar disorder that she knows of. Pt had a similar episode last year which resulted in the pt being PEC'd. Symptoms are constant and moderate in severity. No prior Tx reported. No further complaints or concerns at this time.         Arrival mode: Personal vehicle    PCP: Primary Doctor No       Past Medical History:  Past Medical History:   Diagnosis Date    Bipolar disorder     Restless legs        Past Surgical History:  Past Surgical History:   Procedure Laterality Date    HYSTERECTOMY      TONSILLECTOMY           Family History:  Family History   Problem Relation Age of Onset    Birth defects Neg Hx        Social History:  Social History     Tobacco Use    Smoking status: Current Every Day Smoker   Substance and Sexual Activity    Alcohol use: No    Drug use: No    Sexual activity: Unknown       ROS   Review of Systems   Unable to perform ROS: Psychiatric disorder "   Cardiovascular: Positive for chest pain.   Psychiatric/Behavioral: Positive for self-injury (ingestion of unknown pills) and suicidal ideas.     Physical Exam      Initial Vitals   BP Pulse Resp Temp SpO2   06/20/19 0933 06/20/19 0844 06/20/19 0844 06/20/19 0844 06/20/19 0844   (!) 147/79 (!) 138 20 97.9 °F (36.6 °C) 98 %      MAP       --                 Physical Exam  Nursing Notes and Vital Signs Reviewed.  Constitutional: Patient is in mild distress. Well-developed and well-nourished. Disheveled. Unkept.  Head: Atraumatic. Normocephalic.  Eyes: PERRL. EOM intact. Conjunctivae are not pale. No scleral icterus.  ENT: Mucous membranes are moist. Oropharynx is clear and symmetric.    Neck: Supple. Full ROM. No lymphadenopathy.  Cardiovascular: Tachycardic. Regular rhythm. No murmurs, rubs, or gallops. Distal pulses are 2+ and symmetric.  Pulmonary/Chest: No respiratory distress. Clear to auscultation bilaterally. No wheezing or rales.  Abdominal: Soft and non-distended.  There is no tenderness.  No rebound, guarding, or rigidity.   Musculoskeletal: Moves all extremities. No obvious deformities. No edema.   Skin: Warm and dry.  Neurological:  Alert. Awake. Normal Speech.  Psychiatric:               Behavior: uncooperative, unable to redirect, appears acutely psychotic              Mood and Affect: agitation, anxious              Thought Process: paranoia, disorganized thoughts              Suicidal Ideations: Yes              Suicidal Plan: Specific plan to harm self.  OD on unknown pills         ED Course    Critical Care  Date/Time: 6/20/2019 2:45 PM  Performed by: Leeanna Gill MD  Authorized by: Leeanna Gill MD   Direct patient critical care time: 20 minutes  Additional history critical care time: 11 minutes  Ordering / reviewing critical care time: 9 minutes  Documentation critical care time: 14 minutes  Consulting other physicians critical care time: 6 minutes  Total critical care time  "(exclusive of procedural time) : 60 minutes  Critical care time was exclusive of separately billable procedures and treating other patients and teaching time.  Critical care was necessary to treat or prevent imminent or life-threatening deterioration of the following conditions: acute psychosis; RENATE; tachycardia.  Critical care was time spent personally by me on the following activities: blood draw for specimens, development of treatment plan with patient or surrogate, discussions with consultants, interpretation of cardiac output measurements, evaluation of patient's response to treatment, examination of patient, obtaining history from patient or surrogate, ordering and review of laboratory studies, ordering and performing treatments and interventions, pulse oximetry, re-evaluation of patient's condition and review of old charts.        ED Vital Signs:  Vitals:    06/20/19 0844 06/20/19 0845 06/20/19 0900 06/20/19 0915   BP:       Pulse: (!) 138 (!) 132 (!) 136 108   Resp: 20      Temp: 97.9 °F (36.6 °C)      TempSrc: Axillary      SpO2: 98% 96% 98% 99%   Weight: 61.3 kg (135 lb 1.6 oz)      Height:        06/20/19 0930 06/20/19 0933 06/20/19 0945 06/20/19 1000   BP:  (!) 147/79     Pulse: 103  104 106   Resp:       Temp:       TempSrc:       SpO2: 96%  96% 97%   Weight:       Height:        06/20/19 1015 06/20/19 1105 06/20/19 1717 06/21/19 0311   BP:   (!) 154/77 130/66   Pulse: 102 99 98 93   Resp:    16   Temp:    98.9 °F (37.2 °C)   TempSrc:    Oral   SpO2: 100% 100% 100% 98%   Weight:       Height:    5' 5" (1.651 m)    06/21/19 0651 06/21/19 0817 06/21/19 1205   BP: (!) 144/68 139/77 (!) 158/92   Pulse: 89 78 69   Resp: 16  16   Temp: 98.6 °F (37 °C)  97.2 °F (36.2 °C)   TempSrc: Oral  Oral   SpO2: 98% 98%    Weight:      Height:          Abnormal Lab Results:  Labs Reviewed   CBC W/ AUTO DIFFERENTIAL - Abnormal; Notable for the following components:       Result Value    WBC 17.23 (*)     Mean Corpuscular " Hemoglobin 31.4 (*)     Platelets 357 (*)     Gran # (ANC) 14.1 (*)     Mono # 1.3 (*)     Gran% 82.3 (*)     Lymph% 10.3 (*)     All other components within normal limits   COMPREHENSIVE METABOLIC PANEL - Abnormal; Notable for the following components:    CO2 16 (*)     BUN, Bld 60 (*)     Creatinine 1.8 (*)     Total Protein 8.9 (*)     AST 93 (*)     ALT 46 (*)     Anion Gap 20 (*)     eGFR if  38 (*)     eGFR if non  33 (*)     All other components within normal limits   ACETAMINOPHEN LEVEL - Abnormal; Notable for the following components:    Acetaminophen (Tylenol), Serum <3.0 (*)     All other components within normal limits   URINALYSIS, REFLEX TO URINE CULTURE - Abnormal; Notable for the following components:    Appearance, UA Hazy (*)     Specific Gravity, UA >=1.030 (*)     Protein, UA 1+ (*)     Ketones, UA Trace (*)     Bilirubin (UA) 1+ (*)     Occult Blood UA 3+ (*)     All other components within normal limits    Narrative:     Preferred Collection Type->Urine, Catheterized   CK - Abnormal; Notable for the following components:    CPK 4862 (*)     All other components within normal limits   URINALYSIS MICROSCOPIC - Abnormal; Notable for the following components:    WBC, UA 7 (*)     Bacteria Few (*)     Hyaline Casts, UA 15 (*)     All other components within normal limits    Narrative:     Preferred Collection Type->Urine, Catheterized   CBC W/ AUTO DIFFERENTIAL - Abnormal; Notable for the following components:    WBC 16.68 (*)     Mean Corpuscular Hemoglobin 31.7 (*)     Gran # (ANC) 11.9 (*)     Mono # 1.9 (*)     Lymph% 16.9 (*)     All other components within normal limits   CK - Abnormal; Notable for the following components:    CPK 4125 (*)     All other components within normal limits   COMPREHENSIVE METABOLIC PANEL - Abnormal; Notable for the following components:    Chloride 115 (*)     CO2 18 (*)     BUN, Bld 47 (*)     Calcium 8.6 (*)     AST 81 (*)     All  other components within normal limits   CK - Abnormal; Notable for the following components:    CPK 4175 (*)     All other components within normal limits   CBC W/ AUTO DIFFERENTIAL - Abnormal; Notable for the following components:    WBC 13.55 (*)     Gran # (ANC) 9.4 (*)     Mono # 1.2 (*)     All other components within normal limits   COMPREHENSIVE METABOLIC PANEL - Abnormal; Notable for the following components:    CO2 19 (*)     BUN, Bld 34 (*)     Total Bilirubin 1.2 (*)     AST 91 (*)     All other components within normal limits   CK - Abnormal; Notable for the following components:    CPK 2452 (*)     All other components within normal limits   TSH   DRUG SCREEN PANEL, URINE EMERGENCY    Narrative:     Preferred Collection Type->Urine, Catheterized   ALCOHOL,MEDICAL (ETHANOL)   PREGNANCY TEST, URINE RAPID   HIV 1 / 2 ANTIBODY        All Lab Results:  Results for orders placed or performed during the hospital encounter of 06/20/19   CBC auto differential   Result Value Ref Range    WBC 17.23 (H) 3.90 - 12.70 K/uL    RBC 4.65 4.00 - 5.40 M/uL    Hemoglobin 14.6 12.0 - 16.0 g/dL    Hematocrit 42.0 37.0 - 48.5 %    Mean Corpuscular Volume 90 82 - 98 fL    Mean Corpuscular Hemoglobin 31.4 (H) 27.0 - 31.0 pg    Mean Corpuscular Hemoglobin Conc 34.8 32.0 - 36.0 g/dL    RDW 13.7 11.5 - 14.5 %    Platelets 357 (H) 150 - 350 K/uL    MPV 11.2 9.2 - 12.9 fL    Gran # (ANC) 14.1 (H) 1.8 - 7.7 K/uL    Lymph # 1.8 1.0 - 4.8 K/uL    Mono # 1.3 (H) 0.3 - 1.0 K/uL    Eos # 0.0 0.0 - 0.5 K/uL    Baso # 0.00 0.00 - 0.20 K/uL    Gran% 82.3 (H) 38.0 - 73.0 %    Lymph% 10.3 (L) 18.0 - 48.0 %    Mono% 7.8 4.0 - 15.0 %    Eosinophil% 0.0 0.0 - 8.0 %    Basophil% 0.0 0.0 - 1.9 %    Platelet Estimate Appears normal     Aniso Slight     Poik Slight     Ovalocytes Occasional     Tear Drop Cells Occasional     Differential Method Automated    Comprehensive metabolic panel   Result Value Ref Range    Sodium 144 136 - 145 mmol/L     Biopsy Type: H and E Potassium 4.1 3.5 - 5.1 mmol/L    Chloride 108 95 - 110 mmol/L    CO2 16 (L) 23 - 29 mmol/L    Glucose 101 70 - 110 mg/dL    BUN, Bld 60 (H) 6 - 20 mg/dL    Creatinine 1.8 (H) 0.5 - 1.4 mg/dL    Calcium 10.0 8.7 - 10.5 mg/dL    Total Protein 8.9 (H) 6.0 - 8.4 g/dL    Albumin 4.9 3.5 - 5.2 g/dL    Total Bilirubin 1.0 0.1 - 1.0 mg/dL    Alkaline Phosphatase 76 55 - 135 U/L    AST 93 (H) 10 - 40 U/L    ALT 46 (H) 10 - 44 U/L    Anion Gap 20 (H) 8 - 16 mmol/L    eGFR if African American 38 (A) >60 mL/min/1.73 m^2    eGFR if non African American 33 (A) >60 mL/min/1.73 m^2   TSH   Result Value Ref Range    TSH 0.693 0.400 - 4.000 uIU/mL   Drug screen panel, emergency   Result Value Ref Range    Benzodiazepines Negative     Methadone metabolites Negative     Cocaine (Metab.) Negative     Opiate Scrn, Ur Negative     Barbiturate Screen, Ur Negative     Amphetamine Screen, Ur Presumptive Positive     THC Negative     Phencyclidine Negative     Creatinine, Random Ur 218.6 15.0 - 325.0 mg/dL    Toxicology Information SEE COMMENT    Ethanol   Result Value Ref Range    Alcohol, Medical, Serum <10 <10 mg/dL   Acetaminophen level   Result Value Ref Range    Acetaminophen (Tylenol), Serum <3.0 (L) 10.0 - 20.0 ug/mL   Pregnancy, urine rapid (UPT)   Result Value Ref Range    Preg Test, Ur Negative    HIV 1/2 Ag/Ab (4th Gen)   Result Value Ref Range    HIV 1/2 Ag/Ab Negative Negative   Urinalysis, Reflex to Urine Culture Urine, Catheterized   Result Value Ref Range    Specimen UA Urine, Catheterized     Color, UA Yellow Yellow, Straw, Raeann    Appearance, UA Hazy (A) Clear    pH, UA 5.0 5.0 - 8.0    Specific Gravity, UA >=1.030 (A) 1.005 - 1.030    Protein, UA 1+ (A) Negative    Glucose, UA Negative Negative    Ketones, UA Trace (A) Negative    Bilirubin (UA) 1+ (A) Negative    Occult Blood UA 3+ (A) Negative    Nitrite, UA Negative Negative    Urobilinogen, UA Negative <2.0 EU/dL    Leukocytes, UA Negative Negative   CPK   Result  Value Ref Range    CPK 4862 (H) 20 - 180 U/L   Urinalysis Microscopic   Result Value Ref Range    RBC, UA 2 0 - 4 /hpf    WBC, UA 7 (H) 0 - 5 /hpf    Bacteria Few (A) None-Occ /hpf    Hyaline Casts, UA 15 (A) 0-1/lpf /lpf    Amorphous, UA Few None-Moderate    Microscopic Comment SEE COMMENT    CBC auto differential   Result Value Ref Range    WBC 16.68 (H) 3.90 - 12.70 K/uL    RBC 4.70 4.00 - 5.40 M/uL    Hemoglobin 14.9 12.0 - 16.0 g/dL    Hematocrit 43.2 37.0 - 48.5 %    Mean Corpuscular Volume 92 82 - 98 fL    Mean Corpuscular Hemoglobin 31.7 (H) 27.0 - 31.0 pg    Mean Corpuscular Hemoglobin Conc 34.5 32.0 - 36.0 g/dL    RDW 13.7 11.5 - 14.5 %    Platelets 270 150 - 350 K/uL    MPV 11.0 9.2 - 12.9 fL    Gran # (ANC) 11.9 (H) 1.8 - 7.7 K/uL    Lymph # 2.8 1.0 - 4.8 K/uL    Mono # 1.9 (H) 0.3 - 1.0 K/uL    Eos # 0.0 0.0 - 0.5 K/uL    Baso # 0.01 0.00 - 0.20 K/uL    Gran% 71.5 38.0 - 73.0 %    Lymph% 16.9 (L) 18.0 - 48.0 %    Mono% 11.6 4.0 - 15.0 %    Eosinophil% 0.1 0.0 - 8.0 %    Basophil% 0.1 0.0 - 1.9 %    Platelet Estimate Appears normal     Aniso Slight     Poik Slight     Ovalocytes Occasional     Tear Drop Cells Occasional     Stomatocytes Present     Differential Method Automated    CPK   Result Value Ref Range    CPK 4125 (H) 20 - 180 U/L   Comprehensive metabolic panel   Result Value Ref Range    Sodium 144 136 - 145 mmol/L    Potassium 3.9 3.5 - 5.1 mmol/L    Chloride 115 (H) 95 - 110 mmol/L    CO2 18 (L) 23 - 29 mmol/L    Glucose 106 70 - 110 mg/dL    BUN, Bld 47 (H) 6 - 20 mg/dL    Creatinine 1.1 0.5 - 1.4 mg/dL    Calcium 8.6 (L) 8.7 - 10.5 mg/dL    Total Protein 7.2 6.0 - 8.4 g/dL    Albumin 3.9 3.5 - 5.2 g/dL    Total Bilirubin 0.7 0.1 - 1.0 mg/dL    Alkaline Phosphatase 58 55 - 135 U/L    AST 81 (H) 10 - 40 U/L    ALT 38 10 - 44 U/L    Anion Gap 11 8 - 16 mmol/L    eGFR if African American >60 >60 mL/min/1.73 m^2    eGFR if non African American 60 >60 mL/min/1.73 m^2   CPK   Result Value Ref Range     CPK 4175 (H) 20 - 180 U/L   CBC auto differential   Result Value Ref Range    WBC 13.55 (H) 3.90 - 12.70 K/uL    RBC 4.41 4.00 - 5.40 M/uL    Hemoglobin 13.6 12.0 - 16.0 g/dL    Hematocrit 39.8 37.0 - 48.5 %    Mean Corpuscular Volume 90 82 - 98 fL    Mean Corpuscular Hemoglobin 30.8 27.0 - 31.0 pg    Mean Corpuscular Hemoglobin Conc 34.2 32.0 - 36.0 g/dL    RDW 13.7 11.5 - 14.5 %    Platelets 310 150 - 350 K/uL    MPV 10.9 9.2 - 12.9 fL    Gran # (ANC) 9.4 (H) 1.8 - 7.7 K/uL    Lymph # 2.9 1.0 - 4.8 K/uL    Mono # 1.2 (H) 0.3 - 1.0 K/uL    Eos # 0.1 0.0 - 0.5 K/uL    Baso # 0.03 0.00 - 0.20 K/uL    Gran% 69.6 38.0 - 73.0 %    Lymph% 21.3 18.0 - 48.0 %    Mono% 8.5 4.0 - 15.0 %    Eosinophil% 0.4 0.0 - 8.0 %    Basophil% 0.2 0.0 - 1.9 %    Differential Method Automated    Comprehensive metabolic panel   Result Value Ref Range    Sodium 140 136 - 145 mmol/L    Potassium 3.8 3.5 - 5.1 mmol/L    Chloride 110 95 - 110 mmol/L    CO2 19 (L) 23 - 29 mmol/L    Glucose 96 70 - 110 mg/dL    BUN, Bld 34 (H) 6 - 20 mg/dL    Creatinine 0.9 0.5 - 1.4 mg/dL    Calcium 8.8 8.7 - 10.5 mg/dL    Total Protein 7.5 6.0 - 8.4 g/dL    Albumin 4.0 3.5 - 5.2 g/dL    Total Bilirubin 1.2 (H) 0.1 - 1.0 mg/dL    Alkaline Phosphatase 64 55 - 135 U/L    AST 91 (H) 10 - 40 U/L    ALT 41 10 - 44 U/L    Anion Gap 11 8 - 16 mmol/L    eGFR if African American >60 >60 mL/min/1.73 m^2    eGFR if non African American >60 >60 mL/min/1.73 m^2   CPK   Result Value Ref Range    CPK 2452 (H) 20 - 180 U/L       Imaging Results:  Imaging Results    None          The EKG was ordered, reviewed, and independently interpreted by the ED provider.  Interpretation time: 0919  Rate: 116 BPM  Rhythm: sinus tachycardia  Interpretation: Possible L atrial enlargement. No STEMI.           The Emergency Provider reviewed the vital signs and test results, which are outlined above.    ED Discussion     8:58 AM: The PEC hold has been issued by Dr. Gill at this time for  acute psychosis.    9:04 AM: Due to pt's state, we are unable to get an IV placed and blood work collected. Soft restraints and Geodon will be ordered.    3:12 PM: Pt has been medically cleared by Dr. Gill at this time. Reassessed pt at this time. Pt is resting comfortably and appears in no acute distress. There are no psychiatric services offered at this facility. D/w pt all pertinent ED information and plan to transfer to psychiatric facility for psychiatric treatment. Pt verbalizes understanding. Patient being transferred by [facility] for ongoing personal protection en route. Pt will be transported by personnel trained in CPR and CPI. All questions and complaints have been addressed at this time. Pt condition is stable at this time and is clear to transfer to psychiatric facility at this time.       ED Medication(s):  Medications   ziprasidone injection 20 mg (20 mg Intramuscular Given 6/20/19 0859)   sodium chloride 0.9% bolus 1,000 mL (0 mLs Intravenous Stopped 6/20/19 1341)   sodium chloride 0.9% bolus 1,000 mL (0 mLs Intravenous Stopped 6/20/19 1341)   sodium chloride 0.9% bolus 1,000 mL (0 mLs Intravenous Stopped 6/20/19 1656)   sodium chloride 0.9% bolus 1,000 mL (0 mLs Intravenous Stopped 6/21/19 0311)   0.9%  NaCl infusion (0 mLs Intravenous Stopped 6/21/19 0311)          Medication List      You have not been prescribed any medications.               Medical Decision Making    Medical Decision Making:   Clinical Tests:   Lab Tests: Ordered and Reviewed  Medical Tests: Ordered and Reviewed           Scribe Attestation:   Scribe #1: I performed the above scribed service and the documentation accurately describes the services I performed. I attest to the accuracy of the note 06/20/2019.    Attending:   Physician Attestation Statement for Scribe #1: I, Leeanna Gill MD, personally performed the services described in this documentation, as scribed by Corinne Mack, in my presence, and it is both accurate  and complete.          Clinical Impression       ICD-10-CM ICD-9-CM   1. Acute psychosis F23 298.9   2. Tachycardia R00.0 785.0   3. Methamphetamine abuse F15.10 305.70   4. Intravascular volume depletion E86.1 276.52   5. Acute kidney injury N17.9 584.9       Disposition:   Disposition: Transferred  Condition: Stable           Leeanna Gill MD  06/25/19 0606

## 2023-07-16 ENCOUNTER — HOSPITAL ENCOUNTER (EMERGENCY)
Facility: HOSPITAL | Age: 53
Discharge: PSYCHIATRIC OR SUBSTANCE USE FACILITY | End: 2023-07-16
Attending: INTERNAL MEDICINE
Payer: MEDICAID

## 2023-07-16 VITALS
RESPIRATION RATE: 12 BRPM | TEMPERATURE: 98 F | HEIGHT: 60 IN | BODY MASS INDEX: 27.71 KG/M2 | WEIGHT: 141.13 LBS | HEART RATE: 70 BPM | SYSTOLIC BLOOD PRESSURE: 150 MMHG | DIASTOLIC BLOOD PRESSURE: 96 MMHG | OXYGEN SATURATION: 98 %

## 2023-07-16 DIAGNOSIS — F19.10 POLYSUBSTANCE ABUSE: Primary | ICD-10-CM

## 2023-07-16 DIAGNOSIS — Z00.8 MEDICAL CLEARANCE FOR PSYCHIATRIC ADMISSION: ICD-10-CM

## 2023-07-16 DIAGNOSIS — R45.851 DEPRESSION WITH SUICIDAL IDEATION: ICD-10-CM

## 2023-07-16 DIAGNOSIS — F32.A DEPRESSION WITH SUICIDAL IDEATION: ICD-10-CM

## 2023-07-16 LAB
ALBUMIN SERPL-MCNC: 4.1 G/DL (ref 3.5–5)
ALBUMIN/GLOB SERPL: 0.9 RATIO (ref 1.1–2)
ALP SERPL-CCNC: 96 UNIT/L (ref 40–150)
ALT SERPL-CCNC: 16 UNIT/L (ref 0–55)
APAP SERPL-MCNC: <17.4 UG/ML (ref 17.4–30)
AST SERPL-CCNC: 17 UNIT/L (ref 5–34)
BASOPHILS # BLD AUTO: 0.08 X10(3)/MCL
BASOPHILS NFR BLD AUTO: 0.9 %
BILIRUBIN DIRECT+TOT PNL SERPL-MCNC: 0.2 MG/DL
BUN SERPL-MCNC: 25.3 MG/DL (ref 9.8–20.1)
CALCIUM SERPL-MCNC: 9.6 MG/DL (ref 8.4–10.2)
CHLORIDE SERPL-SCNC: 108 MMOL/L (ref 98–107)
CO2 SERPL-SCNC: 22 MMOL/L (ref 22–29)
CREAT SERPL-MCNC: 1.1 MG/DL (ref 0.55–1.02)
EOSINOPHIL # BLD AUTO: 0.54 X10(3)/MCL (ref 0–0.9)
EOSINOPHIL NFR BLD AUTO: 6.2 %
ERYTHROCYTE [DISTWIDTH] IN BLOOD BY AUTOMATED COUNT: 15.9 % (ref 11.5–17)
ETHANOL SERPL-MCNC: <10 MG/DL
GFR SERPLBLD CREATININE-BSD FMLA CKD-EPI: >60 MLS/MIN/1.73/M2
GLOBULIN SER-MCNC: 4.6 GM/DL (ref 2.4–3.5)
GLUCOSE SERPL-MCNC: 114 MG/DL (ref 74–100)
HCT VFR BLD AUTO: 39.8 % (ref 37–47)
HGB BLD-MCNC: 12.3 G/DL (ref 12–16)
IMM GRANULOCYTES # BLD AUTO: 0.04 X10(3)/MCL (ref 0–0.04)
IMM GRANULOCYTES NFR BLD AUTO: 0.5 %
LYMPHOCYTES # BLD AUTO: 2.02 X10(3)/MCL (ref 0.6–4.6)
LYMPHOCYTES NFR BLD AUTO: 23.4 %
MCH RBC QN AUTO: 29.5 PG (ref 27–31)
MCHC RBC AUTO-ENTMCNC: 30.9 G/DL (ref 33–36)
MCV RBC AUTO: 95.4 FL (ref 80–94)
MONOCYTES # BLD AUTO: 0.61 X10(3)/MCL (ref 0.1–1.3)
MONOCYTES NFR BLD AUTO: 7.1 %
NEUTROPHILS # BLD AUTO: 5.36 X10(3)/MCL (ref 2.1–9.2)
NEUTROPHILS NFR BLD AUTO: 61.9 %
NRBC BLD AUTO-RTO: 0 %
PLATELET # BLD AUTO: 509 X10(3)/MCL (ref 130–400)
PMV BLD AUTO: 9.9 FL (ref 7.4–10.4)
POCT GLUCOSE: 96 MG/DL (ref 70–110)
POTASSIUM SERPL-SCNC: 4.1 MMOL/L (ref 3.5–5.1)
PROT SERPL-MCNC: 8.7 GM/DL (ref 6.4–8.3)
RBC # BLD AUTO: 4.17 X10(6)/MCL (ref 4.2–5.4)
SALICYLATES SERPL-MCNC: <5 MG/DL
SARS-COV-2 RDRP RESP QL NAA+PROBE: NEGATIVE
SODIUM SERPL-SCNC: 142 MMOL/L (ref 136–145)
TSH SERPL-ACNC: 0.84 UIU/ML (ref 0.35–4.94)
WBC # SPEC AUTO: 8.65 X10(3)/MCL (ref 4.5–11.5)

## 2023-07-16 PROCEDURE — 87635 SARS-COV-2 COVID-19 AMP PRB: CPT | Performed by: INTERNAL MEDICINE

## 2023-07-16 PROCEDURE — 80179 DRUG ASSAY SALICYLATE: CPT | Performed by: INTERNAL MEDICINE

## 2023-07-16 PROCEDURE — 25000003 PHARM REV CODE 250: Performed by: INTERNAL MEDICINE

## 2023-07-16 PROCEDURE — 93005 ELECTROCARDIOGRAM TRACING: CPT

## 2023-07-16 PROCEDURE — 99285 EMERGENCY DEPT VISIT HI MDM: CPT

## 2023-07-16 PROCEDURE — 85025 COMPLETE CBC W/AUTO DIFF WBC: CPT | Performed by: INTERNAL MEDICINE

## 2023-07-16 PROCEDURE — 96372 THER/PROPH/DIAG INJ SC/IM: CPT | Performed by: INTERNAL MEDICINE

## 2023-07-16 PROCEDURE — S0166 INJ OLANZAPINE 2.5MG: HCPCS | Performed by: INTERNAL MEDICINE

## 2023-07-16 PROCEDURE — 80053 COMPREHEN METABOLIC PANEL: CPT | Performed by: INTERNAL MEDICINE

## 2023-07-16 PROCEDURE — 84443 ASSAY THYROID STIM HORMONE: CPT | Performed by: INTERNAL MEDICINE

## 2023-07-16 PROCEDURE — 80143 DRUG ASSAY ACETAMINOPHEN: CPT | Performed by: INTERNAL MEDICINE

## 2023-07-16 PROCEDURE — 82077 ASSAY SPEC XCP UR&BREATH IA: CPT | Performed by: INTERNAL MEDICINE

## 2023-07-16 PROCEDURE — 82962 GLUCOSE BLOOD TEST: CPT

## 2023-07-16 RX ORDER — OLANZAPINE 10 MG/2ML
10 INJECTION, POWDER, FOR SOLUTION INTRAMUSCULAR ONCE AS NEEDED
Status: COMPLETED | OUTPATIENT
Start: 2023-07-16 | End: 2023-07-16

## 2023-07-16 RX ORDER — IBUPROFEN 200 MG
1 TABLET ORAL DAILY
Status: DISCONTINUED | OUTPATIENT
Start: 2023-07-16 | End: 2023-07-16 | Stop reason: HOSPADM

## 2023-07-16 RX ADMIN — OLANZAPINE 10 MG: 10 INJECTION, POWDER, FOR SOLUTION INTRAMUSCULAR at 12:07

## 2023-07-16 NOTE — ED PROVIDER NOTES
Encounter Date: 2023       History     Chief Complaint   Patient presents with    Altered Mental Status     EMS called by PD after patient exhibits altered mental status, sluggish to respond to questions, after they were called to evict her from a local motel. EMS admin 1mg Narcan en route, no change. Several medications including Suboxone and Gabapentin with the patient.     Brought by EMS a call from police in a hotel with altered mental status. Patient was lethargic for which paramedics gave her narcan 1 mg with some improvement. On arrival to ED pt AAOx3, depressed, tearful, states her son  from fentanyl OD few weeks ago and she is abusing meth and feeling like nobody care for her. Admits recently released from [facility].    The history is provided by the patient and the EMS personnel.   Review of patient's allergies indicates:   Allergen Reactions    Antihistamines - alkylamine     Codeine berenice-chlorphenir berenice Hives     Past Medical History:   Diagnosis Date    Bipolar disorder     Hepatitis C antibody positive in blood 2020    RNA POSITIVE    Hypertension     Restless legs      Past Surgical History:   Procedure Laterality Date    HYSTERECTOMY      TONSILLECTOMY       Family History   Problem Relation Age of Onset    Birth defects Neg Hx      Social History     Tobacco Use    Smoking status: Every Day    Smokeless tobacco: Current   Substance Use Topics    Alcohol use: No    Drug use: No     Review of Systems   Constitutional:  Negative for fever.   HENT:  Negative for sore throat.    Respiratory:  Negative for shortness of breath.    Cardiovascular:  Negative for chest pain.   Gastrointestinal:  Negative for nausea.   Genitourinary:  Negative for dysuria.   Musculoskeletal:  Negative for back pain.   Skin:  Negative for rash.   Neurological:  Negative for weakness.   Hematological:  Does not bruise/bleed easily.   Psychiatric/Behavioral:  Positive for behavioral problems and suicidal  ideas. The patient is nervous/anxious.    All other systems reviewed and are negative.    Physical Exam     Initial Vitals [07/16/23 1228]   BP Pulse Resp Temp SpO2   (!) 150/96 70 12 97.5 °F (36.4 °C) 98 %      MAP       --         Physical Exam    Nursing note and vitals reviewed.  Constitutional: She appears well-developed and well-nourished. No distress.   HENT:   Head: Normocephalic and atraumatic.   Mouth/Throat: Oropharynx is clear and moist.   Eyes: Conjunctivae and EOM are normal. Pupils are equal, round, and reactive to light.   Neck: Neck supple. No thyromegaly present. No JVD present.   Normal range of motion.  Cardiovascular:  Normal rate, regular rhythm, normal heart sounds and intact distal pulses.           Pulmonary/Chest: Breath sounds normal.   Abdominal: Abdomen is soft. Bowel sounds are normal. She exhibits no distension. There is no abdominal tenderness. There is no rebound and no guarding.   Musculoskeletal:         General: No edema. Normal range of motion.      Cervical back: Normal range of motion and neck supple.     Neurological: She is alert and oriented to person, place, and time. She has normal strength. GCS score is 15. GCS eye subscore is 4. GCS verbal subscore is 5. GCS motor subscore is 6.   Skin: Skin is warm and dry. No rash noted.   Psychiatric: Her mood appears anxious. Her affect is labile. Her speech is rapid and/or pressured and tangential. She is hyperactive. She is not actively hallucinating. Cognition and memory are normal. She expresses impulsivity and inappropriate judgment. She exhibits a depressed mood. She expresses suicidal ideation. She is attentive.       ED Course   Procedures  Labs Reviewed   COMPREHENSIVE METABOLIC PANEL - Abnormal; Notable for the following components:       Result Value    Chloride 108 (*)     Glucose Level 114 (*)     Blood Urea Nitrogen 25.3 (*)     Creatinine 1.10 (*)     Protein Total 8.7 (*)     Globulin 4.6 (*)     Albumin/Globulin  Ratio 0.9 (*)     All other components within normal limits   ACETAMINOPHEN LEVEL - Abnormal; Notable for the following components:    Acetaminophen Level <17.4 (*)     All other components within normal limits   CBC WITH DIFFERENTIAL - Abnormal; Notable for the following components:    RBC 4.17 (*)     MCV 95.4 (*)     MCHC 30.9 (*)     Platelet 509 (*)     All other components within normal limits   ALCOHOL,MEDICAL (ETHANOL) - Normal   SARS-COV-2 RNA AMPLIFICATION, QUAL - Normal    Narrative:     The IDNOW COVID-19 assay is a rapid molecular in vitro diagnostic test utilizing an isothermal nucleic acid amplification technology intended for the qualitative detection of nucleic acid from the SARS-CoV-2 viral RNA in direct nasal, nasopharyngeal or throat swabs from individuals who are suspected of COVID-19 by their healthcare provider.   CBC W/ AUTO DIFFERENTIAL    Narrative:     The following orders were created for panel order CBC auto differential.  Procedure                               Abnormality         Status                     ---------                               -----------         ------                     CBC with Differential[[id number]]        Abnormal            Final result                 Please view results for these tests on the individual orders.   SALICYLATE LEVEL   TSH   URINALYSIS, REFLEX TO URINE CULTURE   DRUG SCREEN, URINE (BEAKER)   POCT GLUCOSE     EKG Readings: (Independently Interpreted)   Initial Reading: No STEMI. Rhythm: Normal Sinus Rhythm. Heart Rate: 70. Ectopy: No Ectopy. Conduction: Normal. ST Segments: Normal ST Segments. T Waves: Normal. Clinical Impression: Normal Sinus Rhythm   ECG Results              EKG 12-lead (In process)  Result time 07/16/23 13:13:08      In process by Interface, Lab In [facility] (07/16/23 13:13:08)                   Narrative:    Test Reason : Z00.8,    Vent. Rate : 069 BPM     Atrial Rate : 069 BPM     P-R Int : 164 ms          QRS Dur : 092 ms       QT Int : 412 ms       P-R-T Axes : 016 025 047 degrees     QTc Int : 441 ms    Normal sinus rhythm  Right ventricular conduction delay  Borderline Abnormal ECG  When compared with ECG of 06-FEB-2022 17:56,  Premature ventricular complexes are no longer Present  Vent. rate has decreased BY  35 BPM  QT has shortened    Referred By: GEORGES   SELF           Confirmed By:                                   Imaging Results    None          Medications   nicotine 14 mg/24 hr 1 patch (has no administration in time range)   OLANZapine injection 10 mg (10 mg Intramuscular Given 7/16/23 1255)     Medical Decision Making:   History:   I obtained history from: EMS provider.       <> Summary of History: See HPI  Differential Diagnosis:   Schizophrenia exacerbation, bipolar psychosis, drug induced psychosis, thyrotoxicosis, renal failure, liver failure, toxydrome, among others    Independently Interpreted Test(s):   I have ordered and independently interpreted EKG Reading(s) - see prior notes  Clinical Tests:   Lab Tests: Ordered  Medical Tests: Ordered              Medically cleared for psychiatry placement: 7/16/2023  1:43 PM         Clinical Impression:   Final diagnoses:  [Z00.8] Medical clearance for psychiatric admission  [F19.10] Polysubstance abuse (Primary)  [F32.A, R45.851] Depression with suicidal ideation        ED Disposition Condition    Transfer to Psych Facility Stable          ED Prescriptions    None       Follow-up Information    None          Mazin Licea MD  07/16/23 9017